# Patient Record
Sex: MALE | Race: WHITE | Employment: OTHER | URBAN - METROPOLITAN AREA
[De-identification: names, ages, dates, MRNs, and addresses within clinical notes are randomized per-mention and may not be internally consistent; named-entity substitution may affect disease eponyms.]

---

## 2019-07-17 ENCOUNTER — OFFICE VISIT (OUTPATIENT)
Dept: PRIMARY CARE CLINIC | Age: 54
End: 2019-07-17
Payer: COMMERCIAL

## 2019-07-17 VITALS
RESPIRATION RATE: 16 BRPM | HEIGHT: 73 IN | BODY MASS INDEX: 33.8 KG/M2 | SYSTOLIC BLOOD PRESSURE: 110 MMHG | HEART RATE: 63 BPM | WEIGHT: 255 LBS | DIASTOLIC BLOOD PRESSURE: 73 MMHG | TEMPERATURE: 98.5 F | OXYGEN SATURATION: 97 %

## 2019-07-17 DIAGNOSIS — J01.10 ACUTE FRONTAL SINUSITIS, RECURRENCE NOT SPECIFIED: Primary | ICD-10-CM

## 2019-07-17 PROCEDURE — 99203 OFFICE O/P NEW LOW 30 MIN: CPT | Performed by: EMERGENCY MEDICINE

## 2019-07-17 RX ORDER — AZITHROMYCIN 250 MG/1
TABLET, FILM COATED ORAL
Qty: 1 PACKET | Refills: 0 | Status: SHIPPED | OUTPATIENT
Start: 2019-07-17 | End: 2019-07-27

## 2019-07-17 SDOH — HEALTH STABILITY: MENTAL HEALTH: HOW OFTEN DO YOU HAVE A DRINK CONTAINING ALCOHOL?: 2-3 TIMES A WEEK

## 2019-07-17 ASSESSMENT — ENCOUNTER SYMPTOMS
SINUS PRESSURE: 1
SORE THROAT: 0

## 2019-07-17 NOTE — PROGRESS NOTES
Subjective:      Patient ID: Joel Babin is a 53 y.o. male. Sinusitis   This is a new problem. Episode onset: 5 days ago. The problem has been gradually worsening since onset. There has been no fever. The pain is moderate. Associated symptoms include congestion, headaches and sinus pressure. Pertinent negatives include no ear pain or sore throat. Past treatments include oral decongestants. The treatment provided mild relief. Review of Systems   HENT: Positive for congestion and sinus pressure. Negative for ear pain and sore throat. Neurological: Positive for headaches. All other systems reviewed and are negative. History reviewed. No pertinent past medical history.   Social History     Socioeconomic History    Marital status:      Spouse name: Not on file    Number of children: Not on file    Years of education: Not on file    Highest education level: Not on file   Occupational History    Not on file   Social Needs    Financial resource strain: Not on file    Food insecurity:     Worry: Not on file     Inability: Not on file    Transportation needs:     Medical: Not on file     Non-medical: Not on file   Tobacco Use    Smoking status: Never Smoker    Smokeless tobacco: Never Used   Substance and Sexual Activity    Alcohol use: Yes     Frequency: 2-3 times a week     Binge frequency: Never    Drug use: Never    Sexual activity: Not on file   Lifestyle    Physical activity:     Days per week: Not on file     Minutes per session: Not on file    Stress: Not on file   Relationships    Social connections:     Talks on phone: Not on file     Gets together: Not on file     Attends Orthodox service: Not on file     Active member of club or organization: Not on file     Attends meetings of clubs or organizations: Not on file     Relationship status: Not on file    Intimate partner violence:     Fear of current or ex partner: Not on file     Emotionally abused: Not on file

## 2020-01-24 ENCOUNTER — OFFICE VISIT (OUTPATIENT)
Dept: PRIMARY CARE CLINIC | Age: 55
End: 2020-01-24
Payer: COMMERCIAL

## 2020-01-24 VITALS
HEIGHT: 73 IN | OXYGEN SATURATION: 96 % | HEART RATE: 67 BPM | WEIGHT: 255 LBS | DIASTOLIC BLOOD PRESSURE: 60 MMHG | BODY MASS INDEX: 33.8 KG/M2 | RESPIRATION RATE: 16 BRPM | SYSTOLIC BLOOD PRESSURE: 120 MMHG | TEMPERATURE: 96 F

## 2020-01-24 PROCEDURE — 99214 OFFICE O/P EST MOD 30 MIN: CPT | Performed by: EMERGENCY MEDICINE

## 2020-01-24 RX ORDER — AZITHROMYCIN 250 MG/1
TABLET, FILM COATED ORAL
Qty: 1 PACKET | Refills: 0 | Status: SHIPPED | OUTPATIENT
Start: 2020-01-24 | End: 2020-02-03

## 2020-01-24 RX ORDER — BENZONATATE 200 MG/1
200 CAPSULE ORAL 3 TIMES DAILY PRN
Qty: 15 CAPSULE | Refills: 0 | Status: SHIPPED | OUTPATIENT
Start: 2020-01-24 | End: 2020-06-05 | Stop reason: ALTCHOICE

## 2020-01-24 ASSESSMENT — ENCOUNTER SYMPTOMS
SORE THROAT: 0
NAUSEA: 0
SHORTNESS OF BREATH: 0
ABDOMINAL PAIN: 0
RHINORRHEA: 0
EYE PAIN: 0
WHEEZING: 0
TROUBLE SWALLOWING: 0
COUGH: 1
DIARRHEA: 0
EYE DISCHARGE: 0
VOMITING: 0

## 2020-01-24 NOTE — PROGRESS NOTES
people with certain health problems. · Be careful when taking over-the-counter cold or flu medicines and Tylenol at the same time. Many of these medicines have acetaminophen, which is Tylenol. Read the labels to make sure that you are not taking more than the recommended dose. Too much acetaminophen (Tylenol) can be harmful. · Get plenty of rest.  · Do not smoke or allow others to smoke around you. If you need help quitting, talk to your doctor about stop-smoking programs and medicines. These can increase your chances of quitting for good. When should you call for help? Call 911 anytime you think you may need emergency care. For example, call if:    · You have severe trouble breathing.    Call your doctor now or seek immediate medical care if:    · You seem to be getting much sicker.     · You have new or worse trouble breathing.     · You have a new or higher fever.     · You have a new rash.    Watch closely for changes in your health, and be sure to contact your doctor if:    · You have a new symptom, such as a sore throat, an earache, or sinus pain.     · You cough more deeply or more often, especially if you notice more mucus or a change in the color of your mucus.     · You do not get better as expected. Where can you learn more? Go to https://Electric Imp.Eniram. org and sign in to your [employer] account. Enter F548 in the [employer]ywire box to learn more about \"Upper Respiratory Infection (Cold): Care Instructions. \"     If you do not have an account, please click on the \"Sign Up Now\" link. Current as of: June 9, 2019  Content Version: 12.3  © 6705-9133 Healthwise, Incorporated. Care instructions adapted under license by [facility] ([facility]). If you have questions about a medical condition or this instruction, always ask your healthcare professional. [place] [address] any warranty or liability for your use of this information.          Patient Education        Cough: Care

## 2020-01-24 NOTE — PATIENT INSTRUCTIONS
RX: ZITHROMAX, TESSALON PERLES    Delsym (over the counter) for cough    Mucinex-D as directed    Use Tylenol or Ibuprofen over the counter (may alternate every 4 hours) as needed for pain/fever    Follow-up with your primary care physician in 1 week if not improving. If you do not have a primary care physician, call [phone] for a referral or visit [email or web address] [employer]/physicians      Patient Education        Upper Respiratory Infection (Cold): Care Instructions  Your Care Instructions    An upper respiratory infection, or URI, is an infection of the nose, sinuses, or throat. URIs are spread by coughs, sneezes, and direct contact. The common cold is the most frequent kind of URI. The flu and sinus infections are other kinds of URIs. Almost all URIs are caused by viruses. Antibiotics won't cure them. But you can treat most infections with home care. This may include drinking lots of fluids and taking over-the-counter pain medicine. You will probably feel better in 4 to 10 days. The doctor has checked you carefully, but problems can develop later. If you notice any problems or new symptoms, get medical treatment right away. Follow-up care is a key part of your treatment and safety. Be sure to make and go to all appointments, and call your doctor if you are having problems. It's also a good idea to know your test results and keep a list of the medicines you take. How can you care for yourself at home? · To prevent dehydration, drink plenty of fluids, enough so that your urine is light yellow or clear like water. Choose water and other caffeine-free clear liquids until you feel better. If you have kidney, heart, or liver disease and have to limit fluids, talk with your doctor before you increase the amount of fluids you drink. · Take an over-the-counter pain medicine, such as acetaminophen (Tylenol), ibuprofen (Advil, Motrin), or naproxen (Aleve). Read and follow all instructions on the label.   · Before you use warranty or liability for your use of this information. Patient Education        Cough: Care Instructions  Your Care Instructions    A cough is your body's response to something that bothers your throat or airways. Many things can cause a cough. You might cough because of a cold or the flu, bronchitis, or asthma. Smoking, postnasal drip, allergies, and stomach acid that backs up into your throat also can cause coughs. A cough is a symptom, not a disease. Most coughs stop when the cause, such as a cold, goes away. You can take a few steps at home to cough less and feel better. Follow-up care is a key part of your treatment and safety. Be sure to make and go to all appointments, and call your doctor if you are having problems. It's also a good idea to know your test results and keep a list of the medicines you take. How can you care for yourself at home? · Drink lots of water and other fluids. This helps thin the mucus and soothes a dry or sore throat. Honey or lemon juice in hot water or tea may ease a dry cough. · Take cough medicine as directed by your doctor. · Prop up your head on pillows to help you breathe and ease a dry cough. · Try cough drops to soothe a dry or sore throat. Cough drops don't stop a cough. Medicine-flavored cough drops are no better than candy-flavored drops or hard candy. · Do not smoke. Avoid secondhand smoke. If you need help quitting, talk to your doctor about stop-smoking programs and medicines. These can increase your chances of quitting for good. When should you call for help? Call 911 anytime you think you may need emergency care.  For example, call if:    · You have severe trouble breathing.    Call your doctor now or seek immediate medical care if:    · You cough up blood.     · You have new or worse trouble breathing.     · You have a new or higher fever.     · You have a new rash.    Watch closely for changes in your health, and be sure to contact your doctor if:    · You cough more deeply or more often, especially if you notice more mucus or a change in the color of your mucus.     · You have new symptoms, such as a sore throat, an earache, or sinus pain.     · You do not get better as expected. Where can you learn more? Go to https://chpepiceweb.[email or web address] org and sign in to your [employer] account. Enter D279 in the [employer] box to learn more about \"Cough: Care Instructions. \"     If you do not have an account, please click on the \"Sign Up Now\" link. Current as of: June 9, 2019  Content Version: 12.3  © 3125-4556 Healthwise, Incorporated. Care instructions adapted under license by [facility] ([facility]). If you have questions about a medical condition or this instruction, always ask your healthcare professional. [address] any warranty or liability for your use of this information.

## 2020-05-19 ENCOUNTER — NURSE ONLY (OUTPATIENT)
Dept: FAMILY MEDICINE CLINIC | Age: 55
End: 2020-05-19
Payer: COMMERCIAL

## 2020-05-19 ENCOUNTER — TELEMEDICINE (OUTPATIENT)
Dept: FAMILY MEDICINE CLINIC | Age: 55
End: 2020-05-19
Payer: COMMERCIAL

## 2020-05-19 VITALS
DIASTOLIC BLOOD PRESSURE: 80 MMHG | WEIGHT: 255 LBS | HEIGHT: 73 IN | OXYGEN SATURATION: 98 % | HEART RATE: 97 BPM | SYSTOLIC BLOOD PRESSURE: 112 MMHG | BODY MASS INDEX: 33.8 KG/M2

## 2020-05-19 PROBLEM — N28.1 RENAL CYST, LEFT: Status: ACTIVE | Noted: 2017-12-12

## 2020-05-19 PROBLEM — E66.9 OBESITY: Status: ACTIVE | Noted: 2020-05-19

## 2020-05-19 PROBLEM — N52.9 ERECTILE DYSFUNCTION: Status: ACTIVE | Noted: 2017-12-12

## 2020-05-19 PROBLEM — I48.91 ATRIAL FIBRILLATION (HCC): Status: ACTIVE | Noted: 2020-05-19

## 2020-05-19 PROCEDURE — 99213 OFFICE O/P EST LOW 20 MIN: CPT | Performed by: FAMILY MEDICINE

## 2020-05-19 PROCEDURE — 93000 ELECTROCARDIOGRAM COMPLETE: CPT | Performed by: FAMILY MEDICINE

## 2020-05-19 PROCEDURE — 93010 ELECTROCARDIOGRAM REPORT: CPT | Performed by: FAMILY MEDICINE

## 2020-05-19 RX ORDER — METOPROLOL SUCCINATE 50 MG/1
50 TABLET, EXTENDED RELEASE ORAL DAILY
Qty: 30 TABLET | Refills: 3 | Status: SHIPPED | OUTPATIENT
Start: 2020-05-19 | End: 2020-06-18 | Stop reason: SDUPTHER

## 2020-05-19 NOTE — PROGRESS NOTES
2020    TELEHEALTH EVALUATION -- Audio/Visual (During [facility]- public health emergency)    CC: A fib    HPI    Karen Stanford (:  1965) [place] 53 y.o. male has requested an audio/video evaluation for the following concern(s):    A fib  Onset unclear, pt first noticed fatigue approx 4-5 weeks ago once he started mowing yard, apple watch alerted him with afib warning  [id number] of 0    obesity  BMI of 33    Low testosterone  Was placed on testosterone patch by last provider but discontinued on his own    Review of Systems    Constitutional: + fatigue for past 4-5 weeks  Pulmonary: No cough    All other systems reviewed and negative    Prior to Visit Medications    Medication Sig Taking? Authorizing Provider   metoprolol succinate (TOPROL XL) 50 MG extended release tablet Take 1 tablet by mouth daily Yes Elza Hernández MD   benzonatate (TESSALON) 200 MG capsule Take 1 capsule by mouth 3 times daily as needed for Cough (cough)  Becky Barnes MD        No Known Allergies    History reviewed. No pertinent past medical history. History reviewed. No pertinent surgical history.     Social History     Socioeconomic History    Marital status:      Spouse name: Not on file    Number of children: Not on file    Years of education: Not on file    Highest education level: Not on file   Occupational History    Not on file   Social Needs    Financial resource strain: Not on file    Food insecurity     Worry: Not on file     Inability: Not on file    Transportation needs     Medical: Not on file     Non-medical: Not on file   Tobacco Use    Smoking status: Never Smoker    Smokeless tobacco: Never Used   Substance and Sexual Activity    Alcohol use: Yes     Frequency: 2-3 times a week     Binge frequency: Never    Drug use: Never    Sexual activity: Not on file   Lifestyle    Physical activity     Days per week: Not on file     Minutes per session: Not on file    Stress: Not on file Rafat Alfaro MD, Cardiology, [facility]    Addendum  EKG interpreted by myself in office    Obesity, unspecified classification, unspecified obesity type, unspecified whether serious comorbidity present  Diet, exercise    Hypotestosteronism  -     Testosterone, free, total; Future    Other fatigue  -     TSH with Reflex; Future  -     CBC; Future    Healthcare maintenance  -     Hemoglobin A1C; Future  -     Lipid Panel; Future  -     Comprehensive Metabolic Panel; Future    Return in about 3 months (around 2020). Nicole Joy (:  1965) [place] 53 y.o. male is being evaluated by a Virtual Visit (video visit) encounter to address concerns as mentioned above. A caregiver was present when appropriate. Due to this being a TeleHealth encounter ([facility]), evaluation of the following organ systems was limited: Vitals/Constitutional/EENT/Resp/CV/GI//MS/Neuro/Skin/Heme-Lymph-Imm. Pursuant to the emergency declaration under the [address] [place] [address], [address] [place] [address] authority and the [facility] and [employer] Act, this Virtual Visit was conducted with patient's (and/or legal guardian's) consent, to reduce the patient's risk of exposure to COVID-19 and provide necessary medical care. The patient (and/or legal guardian) has also been advised to contact this office for worsening conditions or problems, and seek emergency medical treatment and/or call 911 if deemed necessary. Patient identification was verified at the start of the visit: Yes    Total time spent on this encounter: Not billed by time    Services were provided through a video synchronous discussion virtually to substitute for in-person clinic visit. Patient and provider were located at their individual homes. An  electronic signature was used to authenticate this note.     --Criss Cox MD on 2020 at 2:53 PM

## 2020-05-19 NOTE — PROGRESS NOTES
Patient was seen as a VV today and due to his symptoms he was sent to the [facility] to get a EKG. I sent a message to Dr. Lacie Sheth to have him look at the result of the EKG from [facility]. He was at home. He came back and said that he was going to look up to see what medication to put him on due him having A-Fib. I asked if he could go home or does he needs to stay. I got the rest of the vitals because I was not the one that did the EKG. He then asked me to:    [5/19/2020 11:58 AM]  Jovanna Smith R:    once we get the BP, also he will need to start anticoagulation medicine and BP medicine  can you make sure the correct pharmacy is entered in  I will send them in shortly  he needs to  and start taking today. I did not feel comfortable going over his newly Dx with the patient and told Dr. Lacie Sheth that I would tell the patient that he would be calling the patient to go over everything with him. Patient was told he could go home and that Dr. Lacie Sheth would be calling him today.   Patient left with information that meds would be called in and a phone call from MD. I then went to lunch after I told Dr. Lacie Sheth that the patient is waiting for a phone from him

## 2020-05-19 NOTE — PROGRESS NOTES
palpation  Joint:  no active joint inflammation  Musculoskeletal:  negative  Skin:  Warm and dry  Neck:  Negative for JVD and Carotid Bruits. Chest:  Clear to auscultation, respiration easy  Cardiovascular:  irreg irreg S2 normal, no murmur, no rub or thrill. Abdomen:  Soft normal liver and spleen  Extremities:   No edema, clubbing, cyanosis,  Pulses:  pedal pulses are normal.  Neuro: intact    Medications:   Outpatient Encounter Medications as of 5/22/2020   Medication Sig Dispense Refill    apixaban (ELIQUIS) 5 MG TABS tablet Take 1 tablet by mouth 2 times daily 60 tablet 5    metoprolol succinate (TOPROL XL) 50 MG extended release tablet Take 1 tablet by mouth daily 30 tablet 3    benzonatate (TESSALON) 200 MG capsule Take 1 capsule by mouth 3 times daily as needed for Cough (cough) (Patient not taking: Reported on 5/22/2020) 15 capsule 0     No facility-administered encounter medications on file as of 5/22/2020. Lab Data:  CBC: No results for input(s): WBC, HGB, HCT, MCV, PLT in the last 72 hours. BMP: No results for input(s): NA, K, CL, CO2, PHOS, BUN, CREATININE in the last 72 hours. Invalid input(s): CA  LIVER PROFILE: No results for input(s): AST, ALT, LIPASE, BILIDIR, BILITOT, ALKPHOS in the last 72 hours. Invalid input(s): AMYLASE,  ALB  LIPID: No results found for: CHOL  No results found for: TRIG  No results found for: HDL  No results found for: LDLCHOLESTEROL, LDLCALC  No results found for: LABVLDL, VLDL  No results found for: CHOLHDLRATIO  PT/INR: No results for input(s): PROTIME, INR in the last 72 hours. A1C: No results found for: LABA1C  BNP:  No results for input(s): BNP in the last 72 hours.     IMAGING:     [id number] Score for Atrial Fibrillation Stroke Risk   Risk   Factors  Component Value   C CHF No 0   H HTN No 0   A2 Age >= 75 No,  (53 y.o.) 0   D DM No 0   S2 Prior Stroke/TIA No 0   V Vascular Disease No 0   A Age 74-69 No,  (53 y.o.) 0   Sc Sex male 0

## 2020-05-22 ENCOUNTER — OFFICE VISIT (OUTPATIENT)
Dept: CARDIOLOGY CLINIC | Age: 55
End: 2020-05-22
Payer: COMMERCIAL

## 2020-05-22 VITALS
HEIGHT: 73 IN | WEIGHT: 268 LBS | BODY MASS INDEX: 35.52 KG/M2 | HEART RATE: 82 BPM | SYSTOLIC BLOOD PRESSURE: 104 MMHG | DIASTOLIC BLOOD PRESSURE: 70 MMHG | OXYGEN SATURATION: 97 %

## 2020-05-22 PROCEDURE — 99244 OFF/OP CNSLTJ NEW/EST MOD 40: CPT | Performed by: INTERNAL MEDICINE

## 2020-05-22 NOTE — PATIENT INSTRUCTIONS
MARTINEZ with cardioversion at [facility] week after next. Do not eating anything after midnight. Taking morning meds with sip of water. TSH Lipid, and CBC, CMP fasting  Chest xray  1. GXT Myoview after cardioversion  [phone]  2. Start Eliquis 5mg twice a day short term  3.  Continue the Metoprolol

## 2020-05-26 ENCOUNTER — HOSPITAL ENCOUNTER (OUTPATIENT)
Dept: GENERAL RADIOLOGY | Age: 55
Discharge: HOME | End: 2020-05-26
Payer: COMMERCIAL

## 2020-05-26 ENCOUNTER — HOSPITAL ENCOUNTER (OUTPATIENT)
Age: 55
Discharge: HOME | End: 2020-05-26
Payer: COMMERCIAL

## 2020-05-26 ENCOUNTER — TELEPHONE (OUTPATIENT)
Dept: CARDIOLOGY CLINIC | Age: 55
End: 2020-05-26

## 2020-05-26 DIAGNOSIS — E34.9 HYPOTESTOSTERONISM: ICD-10-CM

## 2020-05-26 DIAGNOSIS — Z00.00 HEALTHCARE MAINTENANCE: ICD-10-CM

## 2020-05-26 LAB
A/G RATIO: 1.9 (ref 1.1–2.2)
ALBUMIN SERPL-MCNC: 4.6 G/DL (ref 3.4–5)
ALP BLD-CCNC: 84 U/L (ref 40–129)
ALT SERPL-CCNC: 26 U/L (ref 10–40)
ANION GAP SERPL CALCULATED.3IONS-SCNC: 12 MMOL/L (ref 3–16)
AST SERPL-CCNC: 22 U/L (ref 15–37)
BASOPHILS ABSOLUTE: 0.1 K/UL (ref 0–0.2)
BASOPHILS RELATIVE PERCENT: 1.2 %
BILIRUB SERPL-MCNC: 1.2 MG/DL (ref 0–1)
BUN BLDV-MCNC: 15 MG/DL (ref 7–20)
CALCIUM SERPL-MCNC: 9.6 MG/DL (ref 8.3–10.6)
CHLORIDE BLD-SCNC: 99 MMOL/L (ref 99–110)
CHOLESTEROL, FASTING: 160 MG/DL (ref 0–199)
CO2: 26 MMOL/L (ref 21–32)
CREAT SERPL-MCNC: 1 MG/DL (ref 0.9–1.3)
EOSINOPHILS ABSOLUTE: 0.2 K/UL (ref 0–0.6)
EOSINOPHILS RELATIVE PERCENT: 2.9 %
GFR AFRICAN AMERICAN: >60
GFR NON-AFRICAN AMERICAN: >60
GLOBULIN: 2.4 G/DL
GLUCOSE BLD-MCNC: 110 MG/DL (ref 70–99)
HCT VFR BLD CALC: 47.5 % (ref 40.5–52.5)
HDLC SERPL-MCNC: 61 MG/DL (ref 40–60)
HEMOGLOBIN: 15.6 G/DL (ref 13.5–17.5)
LDL CHOLESTEROL CALCULATED: 87 MG/DL
LYMPHOCYTES ABSOLUTE: 1.5 K/UL (ref 1–5.1)
LYMPHOCYTES RELATIVE PERCENT: 22.2 %
MCH RBC QN AUTO: 29 PG (ref 26–34)
MCHC RBC AUTO-ENTMCNC: 32.9 G/DL (ref 31–36)
MCV RBC AUTO: 88.2 FL (ref 80–100)
MONOCYTES ABSOLUTE: 0.6 K/UL (ref 0–1.3)
MONOCYTES RELATIVE PERCENT: 9.4 %
NEUTROPHILS ABSOLUTE: 4.3 K/UL (ref 1.7–7.7)
NEUTROPHILS RELATIVE PERCENT: 64.3 %
PDW BLD-RTO: 14.5 % (ref 12.4–15.4)
PLATELET # BLD: 237 K/UL (ref 135–450)
PMV BLD AUTO: 9.3 FL (ref 5–10.5)
POTASSIUM SERPL-SCNC: 4.4 MMOL/L (ref 3.5–5.1)
RBC # BLD: 5.38 M/UL (ref 4.2–5.9)
SODIUM BLD-SCNC: 137 MMOL/L (ref 136–145)
TOTAL PROTEIN: 7 G/DL (ref 6.4–8.2)
TRIGLYCERIDE, FASTING: 61 MG/DL (ref 0–150)
TSH REFLEX FT4: 0.97 UIU/ML (ref 0.27–4.2)
VLDLC SERPL CALC-MCNC: 12 MG/DL
WBC # BLD: 6.8 K/UL (ref 4–11)

## 2020-05-26 PROCEDURE — 71046 X-RAY EXAM CHEST 2 VIEWS: CPT

## 2020-05-26 NOTE — TELEPHONE ENCOUNTER
----- Message from Kristi Moran MD sent at 5/26/2020 10:49 AM EDT -----  Chest xray is normal please notify patient.

## 2020-05-27 ENCOUNTER — TELEPHONE (OUTPATIENT)
Dept: CARDIOLOGY CLINIC | Age: 55
End: 2020-05-27

## 2020-05-27 LAB
ESTIMATED AVERAGE GLUCOSE: 125.5 MG/DL
HBA1C MFR BLD: 6 %

## 2020-05-27 NOTE — TELEPHONE ENCOUNTER
Per RKG pt scheduled for MARTINEZ with anesthesia and DCCV for Friday 6/5/20 at 10 am at [place] with [facility]. Procedure reviewed in detail advised NPO after MN day before ok to take 2 meds with sip of water arrive at main entrance at 8:30 am.  He willgo this Friday 5/29/20 to have COVID testing completed orders for all testing placed in epic. Fracisco Neff is aware of policy for covid as well as wearing mask to hospital and need for  to wear mask as well.   Orders faxed to stress lab Margot Lewis RN

## 2020-06-01 LAB
SEX HORMONE BINDING GLOBULIN: 28 NMOL/L (ref 11–80)
TESTOSTERONE FREE-NONMALE: 72.7 PG/ML (ref 47–244)
TESTOSTERONE TOTAL: 333 NG/DL (ref 220–1000)

## 2020-06-02 ENCOUNTER — HOSPITAL ENCOUNTER (OUTPATIENT)
Dept: NON INVASIVE DIAGNOSTICS | Age: 55
Discharge: HOME | End: 2020-06-02
Payer: COMMERCIAL

## 2020-06-02 ENCOUNTER — OFFICE VISIT (OUTPATIENT)
Dept: PRIMARY CARE CLINIC | Age: 55
End: 2020-06-02

## 2020-06-02 LAB
LV EF: 65 %
LVEF MODALITY: NORMAL

## 2020-06-02 PROCEDURE — 78452 HT MUSCLE IMAGE SPECT MULT: CPT

## 2020-06-02 PROCEDURE — A9502 TC99M TETROFOSMIN: HCPCS | Performed by: INTERNAL MEDICINE

## 2020-06-02 PROCEDURE — 3430000000 HC RX DIAGNOSTIC RADIOPHARMACEUTICAL: Performed by: INTERNAL MEDICINE

## 2020-06-02 PROCEDURE — 2580000003 HC RX 258: Performed by: INTERNAL MEDICINE

## 2020-06-02 PROCEDURE — 93017 CV STRESS TEST TRACING ONLY: CPT

## 2020-06-02 RX ORDER — SODIUM CHLORIDE 0.9 % (FLUSH) 0.9 %
10 SYRINGE (ML) INJECTION PRN
Status: DISCONTINUED | OUTPATIENT
Start: 2020-06-02 | End: 2020-06-03 | Stop reason: HOSPADM

## 2020-06-02 RX ADMIN — TETROFOSMIN 33.5 MILLICURIE: 1.38 INJECTION, POWDER, LYOPHILIZED, FOR SOLUTION INTRAVENOUS at 09:04

## 2020-06-02 RX ADMIN — TETROFOSMIN 11.8 MILLICURIE: 1.38 INJECTION, POWDER, LYOPHILIZED, FOR SOLUTION INTRAVENOUS at 08:03

## 2020-06-02 RX ADMIN — Medication 10 ML: at 08:04

## 2020-06-02 RX ADMIN — Medication 10 ML: at 09:04

## 2020-06-03 ENCOUNTER — TELEPHONE (OUTPATIENT)
Dept: CARDIOLOGY CLINIC | Age: 55
End: 2020-06-03

## 2020-06-03 LAB
SARS-COV-2: NOT DETECTED
SOURCE: NORMAL

## 2020-06-04 ENCOUNTER — ANESTHESIA EVENT (OUTPATIENT)
Dept: NON INVASIVE DIAGNOSTICS | Age: 55
End: 2020-06-04

## 2020-06-04 NOTE — ANESTHESIA PRE PROCEDURE
Department of Anesthesiology  Preprocedure Note       Name:  Yasmeen Garcia   Age:  53 y.o.  :  1965                                          MRN:  [id number]         Date:  2020      Surgeon:  Dr. Olimpia Gaming    Procedure: MARTINEZ and Cardioversion    HPI:  This is a 52 y/o male with a h/o atrial fibrillation. EKG on 2020 showed atrial fibrillation with 89 bpm. He states that he started noticing increased fatigue a few months ago about mid March. 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 He denies smoking,  May drink once a year. His father had cardiac history and on his mother's family. He denies HTN or DM. He states that he has had kidney stones in the past. He is taking his medications as prescribed. He does not notice a difference with metoprolol. He states he does have chest tightness at times. Cannot get deep breath. He walks daily about a mile with dog. He has never been tested for sleep apnea. Medications prior to admission:   apixaban (ELIQUIS) 5 MG TABS tablet Take 1 tablet by mouth 2 times daily   metoprolol succinate (TOPROL XL) 50 MG Take 1 tablet by mouth daily   benzonatate (TESSALON) 200 MG capsule Take 1 cap 3 times daily as needed for Cough (cough) Patient not taking: Reported on 2020     Allergies:  No Known Allergies    Problem List:     Hypotestosteronism E34.9    Erectile dysfunction N52.9    Obesity E66.9    Atrial fibrillation (HCC) I48.91     Past Medical History:  No past medical history on file. Past Surgical History:  No past surgical history on file.     Social History:    Tobacco Use    Smoking status: Never Smoker    Smokeless tobacco: Never Used   Substance Use Topics    Alcohol use: Yes     Frequency: 2-3 times a week     Binge frequency: Never     Vital Signs (Current):    BP: 112/72 Pulse: 81   Resp: 21 SpO2: 96   Temp: 97 °F (36.1 °C)   Height: 6' 1\" (1.854 m)  (20) Weight: 262 lb (118.8 kg)

## 2020-06-05 ENCOUNTER — ANESTHESIA (OUTPATIENT)
Dept: NON INVASIVE DIAGNOSTICS | Age: 55
End: 2020-06-05

## 2020-06-05 ENCOUNTER — HOSPITAL ENCOUNTER (OUTPATIENT)
Dept: NON INVASIVE DIAGNOSTICS | Age: 55
Discharge: HOME | End: 2020-06-05
Payer: COMMERCIAL

## 2020-06-05 VITALS
HEIGHT: 73 IN | TEMPERATURE: 96.5 F | OXYGEN SATURATION: 98 % | HEART RATE: 59 BPM | RESPIRATION RATE: 23 BRPM | DIASTOLIC BLOOD PRESSURE: 76 MMHG | BODY MASS INDEX: 34.72 KG/M2 | SYSTOLIC BLOOD PRESSURE: 113 MMHG | WEIGHT: 262 LBS

## 2020-06-05 VITALS
DIASTOLIC BLOOD PRESSURE: 59 MMHG | OXYGEN SATURATION: 99 % | SYSTOLIC BLOOD PRESSURE: 98 MMHG | RESPIRATION RATE: 18 BRPM

## 2020-06-05 LAB
EKG ATRIAL RATE: 394 BPM
EKG ATRIAL RATE: 57 BPM
EKG DIAGNOSIS: NORMAL
EKG DIAGNOSIS: NORMAL
EKG P AXIS: 30 DEGREES
EKG P-R INTERVAL: 138 MS
EKG Q-T INTERVAL: 372 MS
EKG Q-T INTERVAL: 422 MS
EKG QRS DURATION: 96 MS
EKG QRS DURATION: 96 MS
EKG QTC CALCULATION (BAZETT): 410 MS
EKG QTC CALCULATION (BAZETT): 434 MS
EKG R AXIS: 32 DEGREES
EKG R AXIS: 34 DEGREES
EKG T AXIS: 24 DEGREES
EKG T AXIS: 49 DEGREES
EKG VENTRICULAR RATE: 57 BPM
EKG VENTRICULAR RATE: 82 BPM
LV EF: 55 %
LVEF MODALITY: NORMAL

## 2020-06-05 PROCEDURE — 3700000000 HC ANESTHESIA ATTENDED CARE

## 2020-06-05 PROCEDURE — 92960 CARDIOVERSION ELECTRIC EXT: CPT

## 2020-06-05 PROCEDURE — 7100000010 HC PHASE II RECOVERY - FIRST 15 MIN

## 2020-06-05 PROCEDURE — 93325 DOPPLER ECHO COLOR FLOW MAPG: CPT | Performed by: INTERNAL MEDICINE

## 2020-06-05 PROCEDURE — 93320 DOPPLER ECHO COMPLETE: CPT | Performed by: INTERNAL MEDICINE

## 2020-06-05 PROCEDURE — 93312 ECHO TRANSESOPHAGEAL: CPT | Performed by: INTERNAL MEDICINE

## 2020-06-05 PROCEDURE — 93005 ELECTROCARDIOGRAM TRACING: CPT | Performed by: INTERNAL MEDICINE

## 2020-06-05 PROCEDURE — 2580000003 HC RX 258: Performed by: NURSE ANESTHETIST, CERTIFIED REGISTERED

## 2020-06-05 PROCEDURE — 6360000002 HC RX W HCPCS: Performed by: NURSE ANESTHETIST, CERTIFIED REGISTERED

## 2020-06-05 PROCEDURE — 3700000001 HC ADD 15 MINUTES (ANESTHESIA)

## 2020-06-05 PROCEDURE — 7100000011 HC PHASE II RECOVERY - ADDTL 15 MIN

## 2020-06-05 PROCEDURE — 2500000003 HC RX 250 WO HCPCS: Performed by: NURSE ANESTHETIST, CERTIFIED REGISTERED

## 2020-06-05 PROCEDURE — 93312 ECHO TRANSESOPHAGEAL: CPT

## 2020-06-05 PROCEDURE — 93010 ELECTROCARDIOGRAM REPORT: CPT | Performed by: INTERNAL MEDICINE

## 2020-06-05 RX ORDER — SODIUM CHLORIDE 9 MG/ML
INJECTION, SOLUTION INTRAVENOUS CONTINUOUS PRN
Status: DISCONTINUED | OUTPATIENT
Start: 2020-06-05 | End: 2020-06-05 | Stop reason: SDUPTHER

## 2020-06-05 RX ORDER — LIDOCAINE HYDROCHLORIDE 20 MG/ML
INJECTION, SOLUTION EPIDURAL; INFILTRATION; INTRACAUDAL; PERINEURAL PRN
Status: DISCONTINUED | OUTPATIENT
Start: 2020-06-05 | End: 2020-06-05 | Stop reason: SDUPTHER

## 2020-06-05 RX ORDER — PROPOFOL 10 MG/ML
INJECTION, EMULSION INTRAVENOUS PRN
Status: DISCONTINUED | OUTPATIENT
Start: 2020-06-05 | End: 2020-06-05 | Stop reason: SDUPTHER

## 2020-06-05 RX ADMIN — LIDOCAINE HYDROCHLORIDE 100 MG: 20 INJECTION, SOLUTION EPIDURAL; INFILTRATION; INTRACAUDAL; PERINEURAL at 10:01

## 2020-06-05 RX ADMIN — PROPOFOL 190 MG: 10 INJECTION, EMULSION INTRAVENOUS at 10:01

## 2020-06-05 RX ADMIN — SODIUM CHLORIDE: 9 INJECTION, SOLUTION INTRAVENOUS at 09:36

## 2020-06-05 NOTE — PROGRESS NOTES
Discharge instructions given. Pt instructed to call 9-1-1 with CP. Notify Dr. Sirisha Ojeda with any questions or concerns. Pt will f/u with Dr. Sirisha Ojeda in his office.

## 2020-06-05 NOTE — H&P
History and Physical  Bette Skinner   [birth year]    Patient is here for elective   Transesophageal echocardiogram with Direct current cardioversion to follow  Patient has been on anticoagulant eliquis for about 2 weeks and he is compliant with meds  He is NPO since 6 PM last night. Since my first encounter with him 2 weeks ago interim history and current physical exam is unchanged. Ref By Dr Kamari Rocha    Subjective:  Mr. Lisa Chu is being referred by Dr. Norma Ng for fatigue to assess for atrial fibrillation. EKG on 2020 showed atrial fibrillation with 89 bpm.    He states that he started noticing increased fatigue a few months ago about mid March. 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 He denies smoking,  May drink once a year. His father had cardiac history and on his mother's family. He denies HTN or DM. He states that he has had kidney stones in the past. He is taking his medications as prescribed. He does not notice a difference with metoprolol. He states he does have chest tightness at times. Cannot get deep breath. He walks daily about a mile with dog. He has never been tested for sleep apnea.       HPI: He was diagnosed with Afib on 2020. He has no past cardiac history.          Review of Systems:12 point ROS negative in all areas as listed below except as in [place]  Constitutional, EENT, Cardiovascular, pulmonary, GI, , Musculoskeletal, skin, neurological, hematological, endocrine, Psychiatric        Reviewed past medical history, social, and family history. Smoking none alcohol none  Family history of CAD both sides     Past Medical History   History reviewed. No pertinent past medical history. Past Surgical History   History reviewed.  No pertinent surgical history.        Objective:   /70   Pulse 82   Ht 6' 1\" (1.854 m)   Wt 268 lb (121.6 kg)   SpO2 97%   BMI

## 2020-06-10 ENCOUNTER — TELEPHONE (OUTPATIENT)
Dept: CARDIOLOGY CLINIC | Age: 55
End: 2020-06-10

## 2020-06-10 NOTE — TELEPHONE ENCOUNTER
I called patient and discussed his symptoms he may be going back and forth in to afib  He will get 49 hr Holter and then if he is found to be parox afib I want to refer him to Dr Thuy Tirado or Dr Shavonne Null

## 2020-06-11 ENCOUNTER — TELEPHONE (OUTPATIENT)
Dept: CARDIOLOGY CLINIC | Age: 55
End: 2020-06-11

## 2020-06-11 NOTE — PROGRESS NOTES
Referral to Dr. Valentina Duran for sleep study per Dr. Ines Alejandro. Indication SOB, possible SHARRI. Order placed in epic. Called pt to inform him. No answer. VM is full.

## 2020-06-29 ENCOUNTER — TELEPHONE (OUTPATIENT)
Dept: CARDIOLOGY | Age: 55
End: 2020-06-29

## 2020-06-29 RX ORDER — METOPROLOL SUCCINATE 100 MG/1
100 TABLET, EXTENDED RELEASE ORAL DAILY
Qty: 90 TABLET | Refills: 3 | Status: SHIPPED | OUTPATIENT
Start: 2020-06-29 | End: 2020-11-12

## 2020-06-30 ENCOUNTER — OFFICE VISIT (OUTPATIENT)
Dept: CARDIOLOGY CLINIC | Age: 55
End: 2020-06-30
Payer: COMMERCIAL

## 2020-06-30 VITALS
HEIGHT: 73 IN | OXYGEN SATURATION: 98 % | DIASTOLIC BLOOD PRESSURE: 60 MMHG | SYSTOLIC BLOOD PRESSURE: 90 MMHG | BODY MASS INDEX: 35.06 KG/M2 | WEIGHT: 264.5 LBS | HEART RATE: 84 BPM

## 2020-06-30 PROBLEM — R53.83 OTHER FATIGUE: Status: ACTIVE | Noted: 2020-06-30

## 2020-06-30 PROCEDURE — 93000 ELECTROCARDIOGRAM COMPLETE: CPT | Performed by: INTERNAL MEDICINE

## 2020-06-30 PROCEDURE — 99204 OFFICE O/P NEW MOD 45 MIN: CPT | Performed by: INTERNAL MEDICINE

## 2020-06-30 NOTE — PATIENT INSTRUCTIONS
Patient Education        Atrial Fibrillation: Care Instructions  Your Care Instructions     Atrial fibrillation is an irregular and often fast heartbeat. Treating this condition is important for several reasons. It can cause blood clots, which can travel from your heart to your brain and cause a stroke. If you have a fast heartbeat, you may feel lightheaded, dizzy, and weak. An irregular heartbeat can also increase your risk for heart failure. Atrial fibrillation is often the result of another heart condition, such as high blood pressure or coronary artery disease. Making changes to improve your heart condition will help you stay healthy and active. Follow-up care is a key part of your treatment and safety. Be sure to make and go to all appointments, and call your doctor if you are having problems. It's also a good idea to know your test results and keep a list of the medicines you take. How can you care for yourself at home? Medicines  · Take your medicines exactly as prescribed. Call your doctor if you think you are having a problem with your medicine. You will get more details on the specific medicines your doctor prescribes. · If your doctor has given you a blood thinner to prevent a stroke, be sure you get instructions about how to take your medicine safely. Blood thinners can cause serious bleeding problems. · Do not take any vitamins, over-the-counter drugs, or herbal products without talking to your doctor first.  Lifestyle changes  · Do not smoke. Smoking can increase your chance of a stroke and heart attack. If you need help quitting, talk to your doctor about stop-smoking programs and medicines. These can increase your chances of quitting for good. · Eat a heart-healthy diet. · Stay at a healthy weight. Lose weight if you need to. · Limit alcohol to 2 drinks a day for men and 1 drink a day for women. Too much alcohol can cause health problems. · Avoid colds and flu.  Get a pneumococcal vaccine shot. If you have had one before, ask your doctor whether you need another dose. Get a flu shot every year. If you must be around people with colds or flu, wash your hands often. Activity  · If your doctor recommends it, get more exercise. Walking is a good choice. Bit by bit, increase the amount you walk every day. Try for at least 30 minutes on most days of the week. You also may want to swim, bike, or do other activities. Your doctor may suggest that you join a cardiac rehabilitation program so that you can have help increasing your physical activity safely. · Start light exercise if your doctor says it is okay. Even a small amount will help you get stronger, have more energy, and manage stress. Walking is an easy way to get exercise. Start out by walking a little more than you did in the hospital. Gradually increase the amount you walk. · When you exercise, watch for signs that your heart is working too hard. You are pushing too hard if you cannot talk while you are exercising. If you become short of breath or dizzy or have chest pain, sit down and rest immediately. · Check your pulse regularly. Place two fingers on the artery at the palm side of your wrist, in line with your thumb. If your heartbeat seems uneven or fast, talk to your doctor. When should you call for help? [id number] anytime you think you may need emergency care. For example, call if:  · You have symptoms of a heart attack. These may include:  ? Chest pain or pressure, or a strange feeling in the chest.  ? Sweating. ? Shortness of breath. ? Nausea or vomiting. ? Pain, pressure, or a strange feeling in the back, neck, jaw, or upper belly or in one or both shoulders or arms. ? Lightheadedness or sudden weakness. ? A fast or irregular heartbeat. After you call 911, the  may tell you to chew 1 adult-strength or 2 to 4 low-dose aspirin. Wait for an ambulance. Do not try to drive yourself. · You have symptoms of a stroke.  These may rate also may be faster than normal.  Atrial fibrillation can be dangerous because if the heartbeat isn't strong and steady, blood can collect, or pool, in the atria. And pooled blood is more likely to form clots. Clots can travel to the brain, block blood flow, and cause a stroke. Atrial fibrillation can also lead to heart failure. Treatment for atrial fibrillation helps prevent stroke and heart failure. It also helps relieve symptoms. Atrial fibrillation is often caused by another heart problem. It may happen after heart surgery. It may also be caused by other problems, such as an overactive thyroid gland or lung disease. Many people with atrial fibrillation are able to live full and active lives. What are the symptoms? Some people feel symptoms when they have episodes of atrial fibrillation. But other people don't notice any symptoms. If you have symptoms, you may feel:  · A fluttering, racing, or pounding feeling in your chest called palpitations. · Weak or tired. · Dizzy or lightheaded. · Short of breath. · Chest pain. · Confused. You may notice signs of atrial fibrillation when you check your pulse. Your pulse may seem uneven or fast.  What can you expect when you have atrial fibrillation? At first, spells of atrial fibrillation may come on suddenly and last a short time. It may go away on its own or it goes away after treatment. This is called paroxysmal atrial fibrillation. Over time, the spells may last longer and occur more often. They often don't go away on their own. How is it treated? Treatments can help you feel better and prevent future problems, especially stroke and heart failure. The main types of treatment slow the heart rate, control the heart rhythm, and help prevent stroke. Your treatment will depend on the cause of your atrial fibrillation, your symptoms, and your risk for stroke. · Heart rate treatment. Medicine may be used to slow your heart rate.  Your heartbeat may still be irregular. But these medicines keep your heart from beating too fast. They may also help relieve your symptoms. · Heart rhythm treatment. Different treatments may be used to try to stop atrial fibrillation and keep it from returning. They can also relieve symptoms. These treatments include medicine, electrical cardioversion to shock the heart back to a normal rhythm, a procedure called catheter ablation, and heart surgery. · Stroke prevention. You and your doctor can decide how to lower your risk. You may decide to take a blood-thinning medicine called an anticoagulant. How can you live well with it? You can live well and help manage atrial fibrillation by having a heart-healthy lifestyle. This lifestyle may help reduce how often you have episodes of atrial fibrillation. If you are overweight, losing weight can help relieve symptoms. To have a heart-healthy lifestyle:  · Don't smoke. · Eat heart-healthy foods. · Be active. Talk to your doctor about what type and level of exercise is safe for you. · Stay at a healthy weight. Lose weight if you need to. · Avoid alcohol if it triggers symptoms. · Manage other health problems such as high blood pressure, high cholesterol, and diabetes. · Avoid getting sick from the flu. Get a flu shot every year. · Manage stress. Where can you learn more? Go to https://REM ENTERPRISE.[place] [facility] [place]. org and sign in to your [employer] account. Enter W991 in the [facility] box to learn more about \"Learning About Atrial Fibrillation. \"     If you do not have an account, please click on the \"Sign Up Now\" link. Current as of: December 16, 2019               Content Version: 12.5  © 0390-9039 Healthwise, Incorporated. Care instructions adapted under license by [facility] ([facility]).  If you have questions about a medical condition or this instruction, always ask your healthcare professional. [place] [address] any warranty or liability for your use of this information. Patient Education        Learning About Catheter Ablation for Heart Rhythm Problems  What is catheter ablation? Catheter ablation is a procedure that treats heart rhythm problems. These problems include atrial fibrillation, supraventricular tachycardia (SVT), atrial flutter, and ventricular tachycardia. Your heart should have a strong, steady beat. That beat is controlled by the heart's electrical system. Sometimes that system misfires. This causes a heartbeat that is too fast and isn't steady. Catheter ablation is a way to get into your heart and fix the problem. Ablation is not surgery. How is catheter ablation done? Your doctor inserts thin tubes called catheters into a blood vessel in your groin, arm, or neck. Then your doctor feeds them into the heart. Wires in the catheters help the doctor find the problem areas. Then he or she uses the wires to send energy to destroy the tiny areas of heart tissue that are causing the problems. It may seem like a bad idea to destroy parts of your heart on purpose. But the areas that are destroyed are very tiny. They should not affect your heart's ability to do its job. You may be awake during the procedure. Or you may be asleep. The doctor will give you medicines to help you feel relaxed and to numb the areas where the catheters go in. You may feel a little uncomfortable, but you should not feel pain. This procedure usually takes 2 to 6 hours. In rare cases, it can take longer. You may stay overnight in the hospital. How long you stay in the hospital depends on the type of ablation you have. What can you expect after catheter ablation? Do not exercise hard or lift anything heavy for a week. You will probably be able to go back to work and to your normal routine in 1 or 2 days. You may have swelling, bruising, or a small lump around the site where the catheters went into your body. These should go away in 3 to 4 weeks.   You may have to take some medicines for a while. Follow-up care is a key part of your treatment and safety. Be sure to make and go to all appointments, and call your doctor if you are having problems. It's also a good idea to know your test results and keep a list of the medicines you take. Where can you learn more? Go to https://chpepiceweb.[employer].com. org and sign in to your [employer] account. Enter H605 in the [employer] box to learn more about \"Learning About Catheter Ablation for Heart Rhythm Problems. \"     If you do not have an account, please click on the \"Sign Up Now\" link. Current as of: December 16, 2019               Content Version: 12.5  © 8158-2272 Healthwise, [employer]. Care instructions adapted under license by [facility] ([facility]). If you have questions about a medical condition or this instruction, always ask your healthcare professional. [place] [address] any warranty or liability for your use of this information. 1. Atrial fibrillation can increase the risk of stroke. Xarelto is to decrease this risk. Afib decreases cardiac output. Rate control (you are on metoprolol), rhythm control (amiodarone-most side effects from long term use), Multaq,(GI side effects) and Tikosyn (requires admission to start), ablation (possibly stop metoprolol and xarelto after 3 months), and pace ablate discussed. Loop recorder discussed. Goal rate is <90 at rest and <110 while doing daily activities. 2. Success and possible need for more than one procedure discussed for ablation. 3.   Printed information about atrial fibrillation and ablation were given. Patient was encouraged to review information given, and call back with any questions. 4.  Continue current medication  5. Let me know how you would like to proceed. 6.  No limitations on activities  7.   Follow up in 6-8 weeks

## 2020-07-01 ENCOUNTER — TELEPHONE (OUTPATIENT)
Dept: PULMONOLOGY | Age: 55
End: 2020-07-01

## 2020-07-01 NOTE — TELEPHONE ENCOUNTER
Within this Telehealth Consent, the terms you and yours refer to the person using the Telehealth Service (Service), or in the case of a use of the Service by or on behalf of a minor, you and yours refer to and include (i) the parent or legal guardian who provides consent to the use of the Service by such minor or uses the Service on behalf of such minor, and (ii) the minor for whom consent is being provided or on whose behalf the Service is being utilized. When using Service, you will be consulting with your health care providers via the use of Telehealth.   Telehealth involves the delivery of healthcare services using electronic communications, information technology or other means between a healthcare provider and a patient who are not in the same physical location. Telehealth may be used for diagnosis, treatment, follow-up and/or patient education, and may include, but is not limited to, one or more of the following:    Electronic transmission of medical records, photo images, personal health information or other data between a patient and a healthcare provider    Interactions between a patient and healthcare provider via audio, video and/or data communications    Use of output data from medical devices, sound and video files    Anticipated Benefits   The use of Telehealth by your Provider(s) through the Service may have the following possible benefits:    Making it easier and more efficient for you to access medical care and treatment for the conditions treated by such Provider(s) utilizing the Service    Allowing you to obtain medical care and treatment by Provider(s) at times that are convenient for you    Enabling you to interact with Provider(s) without the necessity of an in-office appointment     Possible Risks   While the use of Telehealth can provide potential benefits for you, there are also potential risks associated with the use of Telehealth.  These risks include, but may not be limited to the following:    Your Provider(s) may not able to provide medical treatment for your particular condition and you may be required to seek alternative healthcare or emergency care services.  The electronic systems or other security protocols or safeguards used in the Service could fail, causing a breach of privacy of your medical or other information.  Given regulatory requirements in certain jurisdictions, your Provider(s) diagnosis and/or treatment options, especially pertaining to certain prescriptions, may be limited. Acceptance   1. You understand that Services will be provided via Telehealth. This process involves the use of HIPAA compliant and secure, real-time audio-visual interfacing with a qualified and appropriately trained provider located at [facility]. 2. You understand that, under no circumstances, will this session be recorded. 3. You understand that the Provider(s) at [facility] and other clinical participants will be party to the information obtained during the Telehealth session in accordance with best medical practices. 4. You understand that the information obtained during the Telehealth session will be used to help determine the most appropriate treatment options. 5. You understand that You have the right to revoke this consent at any point in time. 6. You understand that Telehealth is voluntary, and that continued treatment is not dependent upon consent. 7. You understand that, in the event of non-consent to Telehealth services and/or technical difficulties, you will obtain services as typically provided in the absence of Telehealth technology. 8. You understand that this consent will be kept in Your medical record. 9. No potential benefits from the use of Telehealth or specific results can be guaranteed. Your condition may not be cured or improved and, in some cases, may get worse.    10. There are limitations in the provision of medical care and treatment via Telehealth and the Service and you may not be able to receive diagnosis and/or treatment through the Service for every condition for which you seek diagnosis and/or treatment. 11. There are potential risks to the use of Telehealth, including but not limited to the risks described in this Telehealth Consent. 12. Your Provider(s) have discussed the use of Telehealth and the Service with you, including the benefits and risks of such and you have provided oral consent to your Provider(s) for the use of Telehealth and the Service. 15. You understand that it is your duty to provide your Provider(s) truthful, accurate and complete information, including all relevant information regarding care that you may have received or may be receiving from other healthcare providers outside of the Service. 14. You understand that each of your Provider(s) may determine in his or sole discretion that your condition is not suitable for diagnosis and/or treatment using the Service, and that you may need to seek medical care and treatment a specialist or other healthcare provider, outside of the Service. 15. You understand that you are fully responsible for payment for all services provided by Provider(s) or through use of the Service and that you may not be able to use third-party insurance. 16. You represent that (a) you have read this Telehealth Consent carefully, (b) you understand the risks and benefits of the Service and the use of Telehealth in the medical care and treatment provided to you by Provider(s) using the Service, and (c) you have the legal capacity and authority to provide this consent for yourself and/or the minor for which you are consenting under applicable federal and state laws, including laws relating to the age of [de-identified] and/or parental/guardian consent.    17. You give your informed consent to the use of Telehealth by Provider(s) using the Service under the terms described in the Terms of Service and this Telehealth Consent. The patient was read the following statement and has consented to the visit as of 7/1/20. The patient has been scheduled for their first telehealth visit on 7/15/20 with .

## 2020-07-02 ENCOUNTER — TELEPHONE (OUTPATIENT)
Dept: CARDIOLOGY CLINIC | Age: 55
End: 2020-07-02

## 2020-07-02 NOTE — TELEPHONE ENCOUNTER
Spoke with patient. Patient is scheduled with Dr. Sixto Dorado for Afib Ablation with Carto and anesthesia on 7/8/20 at [facility], arrival time of 7:30am to the Cath Lab. Please have patient arrive to the main entrance of [facility] and check in with the registration desk. Please call patient regarding medication instructions. Remind patient to be NPO after midnight (8 hours prior). Do not apply lotions/creams on skin the day of procedure. Covid scheduled 7/6.

## 2020-07-06 ENCOUNTER — OFFICE VISIT (OUTPATIENT)
Dept: PRIMARY CARE CLINIC | Age: 55
End: 2020-07-06
Payer: COMMERCIAL

## 2020-07-06 PROCEDURE — 99211 OFF/OP EST MAY X REQ PHY/QHP: CPT | Performed by: NURSE PRACTITIONER

## 2020-07-06 NOTE — PROGRESS NOTES
Sula Kehr received a viral test for COVID-19. They were educated on isolation and quarantine as appropriate. For any symptoms, they were directed to seek care from their PCP, given contact information to establish with a doctor, directed to an urgent care or the emergency room.

## 2020-07-07 LAB — SARS-COV-2, PCR: NOT DETECTED

## 2020-07-08 ENCOUNTER — ANESTHESIA EVENT (OUTPATIENT)
Dept: CARDIAC CATH/INVASIVE PROCEDURES | Age: 55
End: 2020-07-08
Payer: COMMERCIAL

## 2020-07-08 ENCOUNTER — HOSPITAL ENCOUNTER (OUTPATIENT)
Dept: CARDIAC CATH/INVASIVE PROCEDURES | Age: 55
Setting detail: OBSERVATION
Discharge: HOME | End: 2020-07-09
Attending: INTERNAL MEDICINE | Admitting: INTERNAL MEDICINE
Payer: COMMERCIAL

## 2020-07-08 ENCOUNTER — ANESTHESIA (OUTPATIENT)
Dept: CARDIAC CATH/INVASIVE PROCEDURES | Age: 55
End: 2020-07-08
Payer: COMMERCIAL

## 2020-07-08 VITALS — OXYGEN SATURATION: 77 % | DIASTOLIC BLOOD PRESSURE: 59 MMHG | TEMPERATURE: 95.5 F | SYSTOLIC BLOOD PRESSURE: 82 MMHG

## 2020-07-08 PROBLEM — I48.91 A-FIB (HCC): Status: ACTIVE | Noted: 2020-07-08

## 2020-07-08 LAB
A/G RATIO: 1.5 (ref 1.1–2.2)
ABO/RH: NORMAL
ALBUMIN SERPL-MCNC: 4.3 G/DL (ref 3.4–5)
ALP BLD-CCNC: 82 U/L (ref 40–129)
ALT SERPL-CCNC: 23 U/L (ref 10–40)
ANION GAP SERPL CALCULATED.3IONS-SCNC: 12 MMOL/L (ref 3–16)
ANTIBODY SCREEN: NORMAL
AST SERPL-CCNC: 18 U/L (ref 15–37)
BILIRUB SERPL-MCNC: 1.4 MG/DL (ref 0–1)
BUN BLDV-MCNC: 23 MG/DL (ref 7–20)
CALCIUM SERPL-MCNC: 9.2 MG/DL (ref 8.3–10.6)
CHLORIDE BLD-SCNC: 101 MMOL/L (ref 99–110)
CO2: 25 MMOL/L (ref 21–32)
CREAT SERPL-MCNC: 1 MG/DL (ref 0.9–1.3)
EKG ATRIAL RATE: 441 BPM
EKG DIAGNOSIS: NORMAL
EKG Q-T INTERVAL: 386 MS
EKG QRS DURATION: 94 MS
EKG QTC CALCULATION (BAZETT): 416 MS
EKG R AXIS: 13 DEGREES
EKG T AXIS: 15 DEGREES
EKG VENTRICULAR RATE: 70 BPM
GFR AFRICAN AMERICAN: >60
GFR NON-AFRICAN AMERICAN: >60
GLOBULIN: 2.9 G/DL
GLUCOSE BLD-MCNC: 107 MG/DL (ref 70–99)
HCT VFR BLD CALC: 44.9 % (ref 40.5–52.5)
HEMOGLOBIN: 15 G/DL (ref 13.5–17.5)
INR BLD: 1.09 (ref 0.86–1.14)
LV EF: 55 %
LVEF MODALITY: NORMAL
MCH RBC QN AUTO: 28.6 PG (ref 26–34)
MCHC RBC AUTO-ENTMCNC: 33.4 G/DL (ref 31–36)
MCV RBC AUTO: 85.6 FL (ref 80–100)
PDW BLD-RTO: 14.2 % (ref 12.4–15.4)
PLATELET # BLD: 206 K/UL (ref 135–450)
PMV BLD AUTO: 8.7 FL (ref 5–10.5)
POC ACT LR: 242 SEC
POC ACT LR: 268 SEC
POC ACT LR: 303 SEC
POC ACT LR: 305 SEC
POC ACT LR: 317 SEC
POTASSIUM SERPL-SCNC: 4.2 MMOL/L (ref 3.5–5.1)
PROTHROMBIN TIME: 12.6 SEC (ref 10–13.2)
RBC # BLD: 5.24 M/UL (ref 4.2–5.9)
SODIUM BLD-SCNC: 138 MMOL/L (ref 136–145)
TOTAL PROTEIN: 7.2 G/DL (ref 6.4–8.2)
WBC # BLD: 8 K/UL (ref 4–11)

## 2020-07-08 PROCEDURE — 93656 COMPRE EP EVAL ABLTJ ATR FIB: CPT | Performed by: INTERNAL MEDICINE

## 2020-07-08 PROCEDURE — 7100000000 HC PACU RECOVERY - FIRST 15 MIN

## 2020-07-08 PROCEDURE — 2500000003 HC RX 250 WO HCPCS: Performed by: NURSE ANESTHETIST, CERTIFIED REGISTERED

## 2020-07-08 PROCEDURE — 93613 INTRACARDIAC EPHYS 3D MAPG: CPT

## 2020-07-08 PROCEDURE — 6360000002 HC RX W HCPCS: Performed by: NURSE ANESTHETIST, CERTIFIED REGISTERED

## 2020-07-08 PROCEDURE — 80053 COMPREHEN METABOLIC PANEL: CPT

## 2020-07-08 PROCEDURE — 93655 ICAR CATH ABLTJ DSCRT ARRHYT: CPT | Performed by: INTERNAL MEDICINE

## 2020-07-08 PROCEDURE — 93662 INTRACARDIAC ECG (ICE): CPT

## 2020-07-08 PROCEDURE — 93623 PRGRMD STIMJ&PACG IV RX NFS: CPT | Performed by: INTERNAL MEDICINE

## 2020-07-08 PROCEDURE — 93613 INTRACARDIAC EPHYS 3D MAPG: CPT | Performed by: INTERNAL MEDICINE

## 2020-07-08 PROCEDURE — 86923 COMPATIBILITY TEST ELECTRIC: CPT

## 2020-07-08 PROCEDURE — 2580000003 HC RX 258: Performed by: INTERNAL MEDICINE

## 2020-07-08 PROCEDURE — 36415 COLL VENOUS BLD VENIPUNCTURE: CPT

## 2020-07-08 PROCEDURE — 85347 COAGULATION TIME ACTIVATED: CPT

## 2020-07-08 PROCEDURE — 93656 COMPRE EP EVAL ABLTJ ATR FIB: CPT

## 2020-07-08 PROCEDURE — C1769 GUIDE WIRE: HCPCS

## 2020-07-08 PROCEDURE — C1894 INTRO/SHEATH, NON-LASER: HCPCS

## 2020-07-08 PROCEDURE — 3700000001 HC ADD 15 MINUTES (ANESTHESIA)

## 2020-07-08 PROCEDURE — 2580000003 HC RX 258: Performed by: NURSE ANESTHETIST, CERTIFIED REGISTERED

## 2020-07-08 PROCEDURE — C1730 CATH, EP, 19 OR FEW ELECT: HCPCS

## 2020-07-08 PROCEDURE — 6370000000 HC RX 637 (ALT 250 FOR IP): Performed by: INTERNAL MEDICINE

## 2020-07-08 PROCEDURE — 86850 RBC ANTIBODY SCREEN: CPT

## 2020-07-08 PROCEDURE — 6360000002 HC RX W HCPCS

## 2020-07-08 PROCEDURE — 2709999900 HC NON-CHARGEABLE SUPPLY

## 2020-07-08 PROCEDURE — 93655 ICAR CATH ABLTJ DSCRT ARRHYT: CPT

## 2020-07-08 PROCEDURE — 86900 BLOOD TYPING SEROLOGIC ABO: CPT

## 2020-07-08 PROCEDURE — G0378 HOSPITAL OBSERVATION PER HR: HCPCS

## 2020-07-08 PROCEDURE — 93010 ELECTROCARDIOGRAM REPORT: CPT | Performed by: INTERNAL MEDICINE

## 2020-07-08 PROCEDURE — 85027 COMPLETE CBC AUTOMATED: CPT

## 2020-07-08 PROCEDURE — C1759 CATH, INTRA ECHOCARDIOGRAPHY: HCPCS

## 2020-07-08 PROCEDURE — 93662 INTRACARDIAC ECG (ICE): CPT | Performed by: INTERNAL MEDICINE

## 2020-07-08 PROCEDURE — 3700000000 HC ANESTHESIA ATTENDED CARE

## 2020-07-08 PROCEDURE — 2720000010 HC SURG SUPPLY STERILE

## 2020-07-08 PROCEDURE — 85610 PROTHROMBIN TIME: CPT

## 2020-07-08 PROCEDURE — 2500000003 HC RX 250 WO HCPCS

## 2020-07-08 PROCEDURE — 2580000003 HC RX 258

## 2020-07-08 PROCEDURE — 7100000001 HC PACU RECOVERY - ADDTL 15 MIN

## 2020-07-08 PROCEDURE — 86901 BLOOD TYPING SEROLOGIC RH(D): CPT

## 2020-07-08 PROCEDURE — 93005 ELECTROCARDIOGRAM TRACING: CPT | Performed by: INTERNAL MEDICINE

## 2020-07-08 PROCEDURE — C1732 CATH, EP, DIAG/ABL, 3D/VECT: HCPCS

## 2020-07-08 RX ORDER — HEPARIN SODIUM 10000 [USP'U]/100ML
INJECTION, SOLUTION INTRAVENOUS CONTINUOUS PRN
Status: COMPLETED | OUTPATIENT
Start: 2020-07-08 | End: 2020-07-08

## 2020-07-08 RX ORDER — SODIUM CHLORIDE 0.9 % (FLUSH) 0.9 %
10 SYRINGE (ML) INJECTION PRN
Status: DISCONTINUED | OUTPATIENT
Start: 2020-07-08 | End: 2020-07-09 | Stop reason: HOSPADM

## 2020-07-08 RX ORDER — ROCURONIUM BROMIDE 10 MG/ML
INJECTION, SOLUTION INTRAVENOUS PRN
Status: DISCONTINUED | OUTPATIENT
Start: 2020-07-08 | End: 2020-07-08 | Stop reason: SDUPTHER

## 2020-07-08 RX ORDER — PANTOPRAZOLE SODIUM 40 MG/1
40 TABLET, DELAYED RELEASE ORAL
Status: DISCONTINUED | OUTPATIENT
Start: 2020-07-09 | End: 2020-07-09

## 2020-07-08 RX ORDER — PROPOFOL 10 MG/ML
INJECTION, EMULSION INTRAVENOUS PRN
Status: DISCONTINUED | OUTPATIENT
Start: 2020-07-08 | End: 2020-07-08 | Stop reason: SDUPTHER

## 2020-07-08 RX ORDER — METOPROLOL SUCCINATE 50 MG/1
100 TABLET, EXTENDED RELEASE ORAL DAILY
Status: DISCONTINUED | OUTPATIENT
Start: 2020-07-08 | End: 2020-07-09 | Stop reason: HOSPADM

## 2020-07-08 RX ORDER — AMIODARONE HYDROCHLORIDE 200 MG/1
200 TABLET ORAL DAILY
Status: DISCONTINUED | OUTPATIENT
Start: 2020-07-08 | End: 2020-07-09 | Stop reason: HOSPADM

## 2020-07-08 RX ORDER — HEPARIN SODIUM 1000 [USP'U]/ML
INJECTION, SOLUTION INTRAVENOUS; SUBCUTANEOUS
Status: COMPLETED | OUTPATIENT
Start: 2020-07-08 | End: 2020-07-08

## 2020-07-08 RX ORDER — MIDAZOLAM HYDROCHLORIDE 1 MG/ML
INJECTION INTRAMUSCULAR; INTRAVENOUS PRN
Status: DISCONTINUED | OUTPATIENT
Start: 2020-07-08 | End: 2020-07-08 | Stop reason: SDUPTHER

## 2020-07-08 RX ORDER — ONDANSETRON 2 MG/ML
INJECTION INTRAMUSCULAR; INTRAVENOUS PRN
Status: DISCONTINUED | OUTPATIENT
Start: 2020-07-08 | End: 2020-07-08 | Stop reason: SDUPTHER

## 2020-07-08 RX ORDER — FENTANYL CITRATE 50 UG/ML
INJECTION, SOLUTION INTRAMUSCULAR; INTRAVENOUS PRN
Status: DISCONTINUED | OUTPATIENT
Start: 2020-07-08 | End: 2020-07-08 | Stop reason: SDUPTHER

## 2020-07-08 RX ORDER — LIDOCAINE HYDROCHLORIDE 20 MG/ML
INJECTION, SOLUTION EPIDURAL; INFILTRATION; INTRACAUDAL; PERINEURAL PRN
Status: DISCONTINUED | OUTPATIENT
Start: 2020-07-08 | End: 2020-07-08 | Stop reason: SDUPTHER

## 2020-07-08 RX ORDER — OXYCODONE HYDROCHLORIDE AND ACETAMINOPHEN 5; 325 MG/1; MG/1
1 TABLET ORAL EVERY 4 HOURS PRN
Status: DISCONTINUED | OUTPATIENT
Start: 2020-07-08 | End: 2020-07-09 | Stop reason: HOSPADM

## 2020-07-08 RX ORDER — SUCRALFATE 1 G/1
1 TABLET ORAL EVERY 6 HOURS SCHEDULED
Status: DISCONTINUED | OUTPATIENT
Start: 2020-07-09 | End: 2020-07-09 | Stop reason: HOSPADM

## 2020-07-08 RX ORDER — SODIUM CHLORIDE 9 MG/ML
INJECTION, SOLUTION INTRAVENOUS ONCE
Status: COMPLETED | OUTPATIENT
Start: 2020-07-08 | End: 2020-07-08

## 2020-07-08 RX ORDER — SODIUM CHLORIDE 0.9 % (FLUSH) 0.9 %
10 SYRINGE (ML) INJECTION EVERY 12 HOURS SCHEDULED
Status: DISCONTINUED | OUTPATIENT
Start: 2020-07-08 | End: 2020-07-09 | Stop reason: HOSPADM

## 2020-07-08 RX ORDER — ACETAMINOPHEN 325 MG/1
650 TABLET ORAL EVERY 4 HOURS PRN
Status: DISCONTINUED | OUTPATIENT
Start: 2020-07-08 | End: 2020-07-09 | Stop reason: HOSPADM

## 2020-07-08 RX ORDER — PHENYLEPHRINE HCL IN 0.9% NACL 1 MG/10 ML
SYRINGE (ML) INTRAVENOUS PRN
Status: DISCONTINUED | OUTPATIENT
Start: 2020-07-08 | End: 2020-07-08 | Stop reason: SDUPTHER

## 2020-07-08 RX ORDER — DEXAMETHASONE SODIUM PHOSPHATE 4 MG/ML
INJECTION, SOLUTION INTRA-ARTICULAR; INTRALESIONAL; INTRAMUSCULAR; INTRAVENOUS; SOFT TISSUE PRN
Status: DISCONTINUED | OUTPATIENT
Start: 2020-07-08 | End: 2020-07-08 | Stop reason: SDUPTHER

## 2020-07-08 RX ORDER — HYDROMORPHONE HCL 110MG/55ML
0.5 PATIENT CONTROLLED ANALGESIA SYRINGE INTRAVENOUS
Status: DISCONTINUED | OUTPATIENT
Start: 2020-07-08 | End: 2020-07-09 | Stop reason: HOSPADM

## 2020-07-08 RX ADMIN — HEPARIN SODIUM 5000 UNITS: 1000 INJECTION, SOLUTION INTRAVENOUS; SUBCUTANEOUS at 11:15

## 2020-07-08 RX ADMIN — APIXABAN 5 MG: 5 TABLET, FILM COATED ORAL at 20:05

## 2020-07-08 RX ADMIN — LIDOCAINE HYDROCHLORIDE 50 MG: 20 INJECTION, SOLUTION EPIDURAL; INFILTRATION; INTRACAUDAL; PERINEURAL at 09:31

## 2020-07-08 RX ADMIN — DEXAMETHASONE SODIUM PHOSPHATE 8 MG: 4 INJECTION, SOLUTION INTRAMUSCULAR; INTRAVENOUS at 14:16

## 2020-07-08 RX ADMIN — HEPARIN SODIUM 3000 UNITS: 1000 INJECTION, SOLUTION INTRAVENOUS; SUBCUTANEOUS at 11:24

## 2020-07-08 RX ADMIN — SODIUM CHLORIDE: 9 INJECTION, SOLUTION INTRAVENOUS at 08:35

## 2020-07-08 RX ADMIN — MIDAZOLAM HYDROCHLORIDE 2 MG: 2 INJECTION, SOLUTION INTRAMUSCULAR; INTRAVENOUS at 09:18

## 2020-07-08 RX ADMIN — SUCRALFATE 1 G: 1 TABLET ORAL at 22:20

## 2020-07-08 RX ADMIN — PHENYLEPHRINE HYDROCHLORIDE 45 MCG/MIN: 10 INJECTION INTRAVENOUS at 11:06

## 2020-07-08 RX ADMIN — PHENYLEPHRINE HYDROCHLORIDE 30 MCG/MIN: 10 INJECTION INTRAVENOUS at 10:31

## 2020-07-08 RX ADMIN — APIXABAN 5 MG: 5 TABLET, FILM COATED ORAL at 16:31

## 2020-07-08 RX ADMIN — SODIUM CHLORIDE: 9 INJECTION, SOLUTION INTRAVENOUS at 11:07

## 2020-07-08 RX ADMIN — ROCURONIUM BROMIDE 70 MG: 10 SOLUTION INTRAVENOUS at 09:31

## 2020-07-08 RX ADMIN — METOPROLOL SUCCINATE 100 MG: 50 TABLET, EXTENDED RELEASE ORAL at 16:30

## 2020-07-08 RX ADMIN — AMIODARONE HYDROCHLORIDE 200 MG: 200 TABLET ORAL at 16:30

## 2020-07-08 RX ADMIN — PROPOFOL 200 MG: 10 INJECTION, EMULSION INTRAVENOUS at 09:31

## 2020-07-08 RX ADMIN — ROCURONIUM BROMIDE 30 MG: 10 SOLUTION INTRAVENOUS at 10:33

## 2020-07-08 RX ADMIN — Medication 10 ML: at 20:06

## 2020-07-08 RX ADMIN — FENTANYL CITRATE 100 MCG: 50 INJECTION, SOLUTION INTRAMUSCULAR; INTRAVENOUS at 09:31

## 2020-07-08 RX ADMIN — HEPARIN SODIUM 3000 UNITS/HR: 10000 INJECTION, SOLUTION INTRAVENOUS at 11:16

## 2020-07-08 RX ADMIN — ONDANSETRON 4 MG: 2 INJECTION INTRAMUSCULAR; INTRAVENOUS at 14:16

## 2020-07-08 RX ADMIN — OXYCODONE HYDROCHLORIDE AND ACETAMINOPHEN 1 TABLET: 5; 325 TABLET ORAL at 16:30

## 2020-07-08 RX ADMIN — FENTANYL CITRATE 50 MCG: 50 INJECTION, SOLUTION INTRAMUSCULAR; INTRAVENOUS at 10:55

## 2020-07-08 RX ADMIN — HEPARIN SODIUM 3000 UNITS: 1000 INJECTION, SOLUTION INTRAVENOUS; SUBCUTANEOUS at 11:43

## 2020-07-08 RX ADMIN — Medication 80 MCG: at 14:29

## 2020-07-08 ASSESSMENT — PULMONARY FUNCTION TESTS
PIF_VALUE: 26
PIF_VALUE: 27
PIF_VALUE: 23
PIF_VALUE: 26
PIF_VALUE: 23
PIF_VALUE: 27
PIF_VALUE: 26
PIF_VALUE: 2
PIF_VALUE: 26
PIF_VALUE: 27
PIF_VALUE: 23
PIF_VALUE: 27
PIF_VALUE: 26
PIF_VALUE: 27
PIF_VALUE: 1
PIF_VALUE: 2
PIF_VALUE: 27
PIF_VALUE: 24
PIF_VALUE: 26
PIF_VALUE: 24
PIF_VALUE: 27
PIF_VALUE: 2
PIF_VALUE: 22
PIF_VALUE: 22
PIF_VALUE: 26
PIF_VALUE: 23
PIF_VALUE: 26
PIF_VALUE: 26
PIF_VALUE: 27
PIF_VALUE: 26
PIF_VALUE: 27
PIF_VALUE: 26
PIF_VALUE: 27
PIF_VALUE: 26
PIF_VALUE: 3
PIF_VALUE: 26
PIF_VALUE: 21
PIF_VALUE: 27
PIF_VALUE: 26
PIF_VALUE: 25
PIF_VALUE: 26
PIF_VALUE: 26
PIF_VALUE: 27
PIF_VALUE: 1
PIF_VALUE: 26
PIF_VALUE: 27
PIF_VALUE: 1
PIF_VALUE: 26
PIF_VALUE: 27
PIF_VALUE: 25
PIF_VALUE: 26
PIF_VALUE: 26
PIF_VALUE: 28
PIF_VALUE: 26
PIF_VALUE: 8
PIF_VALUE: 26
PIF_VALUE: 26
PIF_VALUE: 27
PIF_VALUE: 27
PIF_VALUE: 26
PIF_VALUE: 27
PIF_VALUE: 27
PIF_VALUE: 22
PIF_VALUE: 26
PIF_VALUE: 22
PIF_VALUE: 26
PIF_VALUE: 27
PIF_VALUE: 25
PIF_VALUE: 21
PIF_VALUE: 26
PIF_VALUE: 27
PIF_VALUE: 27
PIF_VALUE: 26
PIF_VALUE: 25
PIF_VALUE: 1
PIF_VALUE: 26
PIF_VALUE: 27
PIF_VALUE: 26
PIF_VALUE: 22
PIF_VALUE: 26
PIF_VALUE: 26
PIF_VALUE: 23
PIF_VALUE: 25
PIF_VALUE: 22
PIF_VALUE: 27
PIF_VALUE: 26
PIF_VALUE: 2
PIF_VALUE: 24
PIF_VALUE: 27
PIF_VALUE: 26
PIF_VALUE: 22
PIF_VALUE: 27
PIF_VALUE: 21
PIF_VALUE: 24
PIF_VALUE: 27
PIF_VALUE: 26
PIF_VALUE: 27
PIF_VALUE: 23
PIF_VALUE: 17
PIF_VALUE: 27
PIF_VALUE: 26
PIF_VALUE: 25
PIF_VALUE: 27
PIF_VALUE: 24
PIF_VALUE: 26
PIF_VALUE: 0
PIF_VALUE: 26
PIF_VALUE: 26
PIF_VALUE: 19
PIF_VALUE: 27
PIF_VALUE: 26
PIF_VALUE: 27
PIF_VALUE: 1
PIF_VALUE: 27
PIF_VALUE: 20
PIF_VALUE: 26
PIF_VALUE: 26
PIF_VALUE: 24
PIF_VALUE: 26
PIF_VALUE: 27
PIF_VALUE: 24
PIF_VALUE: 27
PIF_VALUE: 23
PIF_VALUE: 27
PIF_VALUE: 21
PIF_VALUE: 23
PIF_VALUE: 19
PIF_VALUE: 27
PIF_VALUE: 22
PIF_VALUE: 22
PIF_VALUE: 21
PIF_VALUE: 22
PIF_VALUE: 26
PIF_VALUE: 23
PIF_VALUE: 23
PIF_VALUE: 27
PIF_VALUE: 24
PIF_VALUE: 27
PIF_VALUE: 27
PIF_VALUE: 26
PIF_VALUE: 22
PIF_VALUE: 24
PIF_VALUE: 26
PIF_VALUE: 27
PIF_VALUE: 26
PIF_VALUE: 26
PIF_VALUE: 27
PIF_VALUE: 27
PIF_VALUE: 18
PIF_VALUE: 28
PIF_VALUE: 26
PIF_VALUE: 26
PIF_VALUE: 23
PIF_VALUE: 27
PIF_VALUE: 23
PIF_VALUE: 26
PIF_VALUE: 27
PIF_VALUE: 27
PIF_VALUE: 26
PIF_VALUE: 27
PIF_VALUE: 24
PIF_VALUE: 26
PIF_VALUE: 27
PIF_VALUE: 0
PIF_VALUE: 26
PIF_VALUE: 26
PIF_VALUE: 2
PIF_VALUE: 27
PIF_VALUE: 26
PIF_VALUE: 0
PIF_VALUE: 26
PIF_VALUE: 23
PIF_VALUE: 26
PIF_VALUE: 25
PIF_VALUE: 27
PIF_VALUE: 26
PIF_VALUE: 27
PIF_VALUE: 27
PIF_VALUE: 23
PIF_VALUE: 27
PIF_VALUE: 26
PIF_VALUE: 26
PIF_VALUE: 23
PIF_VALUE: 26
PIF_VALUE: 25
PIF_VALUE: 23
PIF_VALUE: 27
PIF_VALUE: 2
PIF_VALUE: 8
PIF_VALUE: 27
PIF_VALUE: 25
PIF_VALUE: 23
PIF_VALUE: 26
PIF_VALUE: 26
PIF_VALUE: 25
PIF_VALUE: 20
PIF_VALUE: 26
PIF_VALUE: 26
PIF_VALUE: 21
PIF_VALUE: 26
PIF_VALUE: 21
PIF_VALUE: 26
PIF_VALUE: 23
PIF_VALUE: 25
PIF_VALUE: 24
PIF_VALUE: 26
PIF_VALUE: 22
PIF_VALUE: 23
PIF_VALUE: 24
PIF_VALUE: 28
PIF_VALUE: 27
PIF_VALUE: 22
PIF_VALUE: 26
PIF_VALUE: 26
PIF_VALUE: 23
PIF_VALUE: 22
PIF_VALUE: 26
PIF_VALUE: 25
PIF_VALUE: 26
PIF_VALUE: 1
PIF_VALUE: 25
PIF_VALUE: 26
PIF_VALUE: 24
PIF_VALUE: 10
PIF_VALUE: 26
PIF_VALUE: 23
PIF_VALUE: 27
PIF_VALUE: 24
PIF_VALUE: 18
PIF_VALUE: 26
PIF_VALUE: 26
PIF_VALUE: 27
PIF_VALUE: 27
PIF_VALUE: 22
PIF_VALUE: 27
PIF_VALUE: 25
PIF_VALUE: 26
PIF_VALUE: 27
PIF_VALUE: 24
PIF_VALUE: 23
PIF_VALUE: 26
PIF_VALUE: 22
PIF_VALUE: 25
PIF_VALUE: 25
PIF_VALUE: 26
PIF_VALUE: 26
PIF_VALUE: 27
PIF_VALUE: 4
PIF_VALUE: 27
PIF_VALUE: 22
PIF_VALUE: 24
PIF_VALUE: 26
PIF_VALUE: 22
PIF_VALUE: 26
PIF_VALUE: 27
PIF_VALUE: 22
PIF_VALUE: 27
PIF_VALUE: 15
PIF_VALUE: 24
PIF_VALUE: 27
PIF_VALUE: 0
PIF_VALUE: 25
PIF_VALUE: 26
PIF_VALUE: 26
PIF_VALUE: 27
PIF_VALUE: 27
PIF_VALUE: 26
PIF_VALUE: 25
PIF_VALUE: 25
PIF_VALUE: 23
PIF_VALUE: 22
PIF_VALUE: 23
PIF_VALUE: 27
PIF_VALUE: 23
PIF_VALUE: 26
PIF_VALUE: 21
PIF_VALUE: 24

## 2020-07-08 ASSESSMENT — LIFESTYLE VARIABLES: SMOKING_STATUS: 0

## 2020-07-08 ASSESSMENT — PAIN SCALES - GENERAL
PAINLEVEL_OUTOF10: 7
PAINLEVEL_OUTOF10: 0

## 2020-07-08 NOTE — ANESTHESIA POSTPROCEDURE EVALUATION
Department of Anesthesiology  Postprocedure Note    Patient: [place]  MRN: [id number]  YOB: 1965  Date of evaluation: 7/8/2020    Procedure Summary     Date:  07/08/20 Room / Location:  [facility] Cardiac Cath Lab    Anesthesia Start:  0919 Anesthesia Stop:  1459    Procedure:  ABLATION Diagnosis:       Unspecified atrial fibrillation      A-fib ([address]      Unspecified atrial fibrillation    Scheduled Providers:   Responsible Provider:  Sundeep Eid MD    Anesthesia Type:  general ASA Status:  3        Anesthesia Type: general    Last vitals: Reviewed and per EMR flowsheets.      Anesthesia Post Evaluation   Anesthetic Problems: no   Cardiovascular System Stable: yes  Respiratory Function: Airway Patent yes  ETT no  Ventilator no  Level of consciousness: awake, alert and oriented  Post-op pain: adequate analgesia  Hydration Adequate: yes  Nausea/Vomiting:no  Other Issues:     Ivan Soria MD

## 2020-07-08 NOTE — PROCEDURES
Electrophysiology Procedure Report    Attending   Tilda Frankel, MD    Procedures Atrial fibrillation ablation  Diagnostic electrophysiologic study  3-D electroanatomical mapping of the left atrium and right atrium  Transseptal catheterization  Wide area circumferential ablation with PV isolation  Ablation of typical atrial flutter  Intra-cardiac echocardiography  MARTINEZ    Indications  Symptomatic paroxysmal atrial fibrillation    Description of Procedure   After the risks and benefits of the procedure were explained and informed consent was obtained, the patient was brought to the electrophysiology lab in the nonsedated and fasting state. General anesthesia was administered under anesthesia physician/nurse guidance (please see anesthesia records for medication details). MARTINEZ was performed because patient missed doses of anticoagulation prior to procedure. No left atrial appendage thrombus. Romero catheter was not placed prior to the start of the procedure. The patient was connected to the EP lab monitoring system. The patient was prepared and draped in standard fashion. Using the modified Seldinger technique, the following sheaths and catheters were inserted under ultrasound guidance:    1. A 8 F sheath was inserted into the right femoral vein. This sheath was exchanged for SL1 sheath. A BRK1 needled was inserted into the SL1 sheath. This was advanced bing the the left atrium via the modified Brockenbrough procedure for trans-septal puncture under ultrasound and fluoroscopy guidance however we were unable to reach septum. The SL1 was exchanged for an Agylis sheath. The modified Brockenbrough procedure for trans-septal punture under ultrasound and fluoroscopy guidance using BRK1 needle (long needle). The Agylis sheath was pulled back into the right atrium. The ablation catheter was inserted into the SL1 after exchange for second 8 F sheath.   Using over the wire technique the SL1 was advanced into the left atrium. The Agylis sheath was advanced over wire into the left atrium. The ablation catheter was advanced into the left atrium for mapping and ablation. E    2. A 8 F sheath was inserted into the right femoral vein. This sheath was exchanged for SL1 sheath. The SL1 was advanced into the left atrium as described above. The Pentaray was advanced into the left atrium via the SL1 sheath. 3.  A 8 F sheath was inserted into the left femoral vein. Uni-directional multipolar catheter was inserted via this sheath into the right atrium and coronary sinus for mapping and pacing. 4.  A 9 F sheath was inserted into the left femoral vein. Via this sheath the ICE catheter was advanced into the right atrium and right ventricle for transseptal, mapping, and functional/mechanical evaluation during ablation. The patient arrived in atrial fibrillation. Given the patient's history of atrial fibrillation without atrial flutter no CTI was performed. Right atrial anatomy was generated using decanav CS catheter and Carto Mapping system (3D). Prior to transseptal catheterization, IV heparin was admistered for a target ACT >300 for the duration of the procedure. Transseptal catheterization was performed under intracardiac echocardiographic guidance. The PVs were mapped with a Pentray catheter using Carto Mapping system, demonstrating conduction into the PVs.  WACA was performed and bilateral PVAI was achieved with bilateral birgit line (of note, right anterior isaac line was not performed because of phrenic stimulation). The PVs were remapped with the Pentray catheter and demonstrated entrance and exit block. Adenosine testing revealed no latent/recurrent PV conduction. After a 30 minute waiting period, confirmation of PV isolation with exit and entrance block was performed in all 4 PVs.  After isolation of left pulmonary vein patient went into typical flutter based on EKG.   Entrainment mapping in left and right atrium confirmed typical flutter. After determining that the cavotricuspid isthmus was in the circuit, the decision to proceed with the cavotricuspid isthmus ablation was made. The ablation catheter was positioned across the isthmus, and a linear ablation was performed from the AV groove through the cavotricuspid isthmus to the insertion of the inferior vena cava (confirmed by Carto and ICE). During the application of energy, atrial flutter was terminated. Pacing was performed at both sides of the isthmus ablation site and revealed bidirectional conduction block across the isthmus. Electrophysiology study was performed/attempted (see below). Catheters were removed (figure of 8 stitch bilaterally) and heparin was reversed per standard protocol. The esophagus was located in close proximity direct right PV, and in this region the maximal power delivered was 30W and the maximal temperature was 37.5 C. The patient was allowed to awake from anesthesia and was returned to prep and recovery for further recovery. Dr. Jameson Dorado MD, the attending physician, was present throughout the procedure and for interpretation of the data. Complications  none  EBL   <50cc    RESULTS    ECG   Baseline   Atrial flutter       Post Ablation   normal sinus rhythm with nonspecific ST-T wave abnormalities    RESULTS  Basic Intervals  Status Rhythm PA AH HV IN QRS QT RR   Post NSR - - - 133 93 380 798     Stimulation measurements  2 to 1 CL Wenck CL Dual AVN  Pathway? VA conduction SVT Comment    440 - - - Junctional Post Decremental pacing     Refractory Periods  PCL A ERP PCL AVN ERP PCL V ERP PCL V ERP  PCL Access path ERP Comment   - - - - - - - - - - -     Supraventricular Arryhthmia(s) induced  Arrhythmia Induced Arrhythmia CL Duration Term How Induced? Morphology AXIS Comment   none            Ventricular Arrhythmia(s) induced  Arrhythmia induced Arrhythmia CL Duration Term How Induced?  Morphology AXIS VT- Transition

## 2020-07-08 NOTE — ANESTHESIA PRE PROCEDURE
Department of Anesthesiology  Preprocedure Note       Name:  Sula Kehr   Age:  53 y.o.  :  1965                                          MRN:  [id number]         Date:  2020      Surgeon: * Surgery not found *    Procedure:     Medications prior to admission:   Prior to Admission medications    Medication Sig Start Date End Date Taking? Authorizing Provider   apixaban (ELIQUIS) 5 MG TABS tablet Take 5 mg by mouth 2 times daily   Yes Historical Provider, MD   metoprolol succinate (TOPROL XL) 100 MG extended release tablet Take 1 tablet by mouth daily 20  Yes Jesus Hernández MD       Current medications:    Current Facility-Administered Medications   Medication Dose Route Frequency Provider Last Rate Last Dose    0.9 % sodium chloride infusion   Intravenous Once Myrlene Riedel., MD           Allergies:  No Known Allergies    Problem List:    Patient Active Problem List   Diagnosis Code    Hypotestosteronism E34.9    Erectile dysfunction N52.9    Obesity E66.9    Atrial fibrillation ([address] I48.91    Other fatigue R53.83       Past Medical History:  History reviewed. No pertinent past medical history. Past Surgical History:  History reviewed. No pertinent surgical history.     Social History:    Social History     Tobacco Use    Smoking status: Never Smoker    Smokeless tobacco: Never Used   Substance Use Topics    Alcohol use: Yes     Frequency: 2-3 times a week     Binge frequency: Never                                Counseling given: Not Answered      Vital Signs (Current):   Vitals:    20 0855   Weight: 266 lb 12.8 oz (121 kg)   Height: 6' 1\" (1.854 m)                                              BP Readings from Last 3 Encounters:   20 90/60   20 (!) 98/59   20 113/76       NPO Status: Time of last liquid consumption: 2300                        Time of last solid consumption: 2300                                                      BMI:   Wt Readings fibrillation,     (-) past MI    NYHA Classification: I  ECG reviewed  Rhythm: irregular  Rate: normal  Echocardiogram reviewed         Beta Blocker:  No for medical reason      ROS comment: Echo  EF 55%  Normal valves       Neuro/Psych:               GI/Hepatic/Renal:   (+) morbid obesity     (-) GERD       Endo/Other: Negative Endo/Other ROS                    Abdominal:   (+) obese,         Vascular:                                        Anesthesia Plan      general     ASA 3       Induction: intravenous. MIPS: Prophylactic antiemetics administered. Anesthetic plan and risks discussed with patient and spouse. Plan discussed with CRNA.     Attending anesthesiologist reviewed and agrees with Pre Eval content              Lieutenant Galeazzi, DO   7/8/2020

## 2020-07-08 NOTE — PROGRESS NOTES
Pt arrived to PACU in stable condition. Bilateral groin sites, soft and intact. Report received and placed on monitor.

## 2020-07-09 VITALS
RESPIRATION RATE: 16 BRPM | SYSTOLIC BLOOD PRESSURE: 110 MMHG | BODY MASS INDEX: 36.25 KG/M2 | OXYGEN SATURATION: 99 % | WEIGHT: 273.5 LBS | HEART RATE: 74 BPM | DIASTOLIC BLOOD PRESSURE: 71 MMHG | TEMPERATURE: 97.5 F | HEIGHT: 73 IN

## 2020-07-09 PROBLEM — I45.10 RBBB: Status: ACTIVE | Noted: 2020-07-09

## 2020-07-09 PROBLEM — I48.0 PAROXYSMAL ATRIAL FIBRILLATION (HCC): Status: ACTIVE | Noted: 2020-05-19

## 2020-07-09 LAB
ANION GAP SERPL CALCULATED.3IONS-SCNC: 9 MMOL/L (ref 3–16)
BUN BLDV-MCNC: 17 MG/DL (ref 7–20)
CALCIUM SERPL-MCNC: 8.6 MG/DL (ref 8.3–10.6)
CHLORIDE BLD-SCNC: 104 MMOL/L (ref 99–110)
CO2: 25 MMOL/L (ref 21–32)
CREAT SERPL-MCNC: 1 MG/DL (ref 0.9–1.3)
EKG ATRIAL RATE: 71 BPM
EKG DIAGNOSIS: NORMAL
EKG P AXIS: 46 DEGREES
EKG P-R INTERVAL: 170 MS
EKG Q-T INTERVAL: 432 MS
EKG QRS DURATION: 130 MS
EKG QTC CALCULATION (BAZETT): 469 MS
EKG R AXIS: 47 DEGREES
EKG T AXIS: 0 DEGREES
EKG VENTRICULAR RATE: 71 BPM
GFR AFRICAN AMERICAN: >60
GFR NON-AFRICAN AMERICAN: >60
GLUCOSE BLD-MCNC: 155 MG/DL (ref 70–99)
HCT VFR BLD CALC: 39.8 % (ref 40.5–52.5)
HEMOGLOBIN: 13.1 G/DL (ref 13.5–17.5)
MCH RBC QN AUTO: 28.5 PG (ref 26–34)
MCHC RBC AUTO-ENTMCNC: 32.9 G/DL (ref 31–36)
MCV RBC AUTO: 86.9 FL (ref 80–100)
PDW BLD-RTO: 14.6 % (ref 12.4–15.4)
PLATELET # BLD: 186 K/UL (ref 135–450)
PMV BLD AUTO: 8.7 FL (ref 5–10.5)
POTASSIUM SERPL-SCNC: 4.8 MMOL/L (ref 3.5–5.1)
RBC # BLD: 4.58 M/UL (ref 4.2–5.9)
SODIUM BLD-SCNC: 138 MMOL/L (ref 136–145)
WBC # BLD: 15.6 K/UL (ref 4–11)

## 2020-07-09 PROCEDURE — 93010 ELECTROCARDIOGRAM REPORT: CPT | Performed by: INTERNAL MEDICINE

## 2020-07-09 PROCEDURE — 6370000000 HC RX 637 (ALT 250 FOR IP): Performed by: INTERNAL MEDICINE

## 2020-07-09 PROCEDURE — 80048 BASIC METABOLIC PNL TOTAL CA: CPT

## 2020-07-09 PROCEDURE — G0378 HOSPITAL OBSERVATION PER HR: HCPCS

## 2020-07-09 PROCEDURE — 99217 PR OBSERVATION CARE DISCHARGE MANAGEMENT: CPT | Performed by: NURSE PRACTITIONER

## 2020-07-09 PROCEDURE — 93005 ELECTROCARDIOGRAM TRACING: CPT | Performed by: INTERNAL MEDICINE

## 2020-07-09 PROCEDURE — 85027 COMPLETE CBC AUTOMATED: CPT

## 2020-07-09 PROCEDURE — 2580000003 HC RX 258: Performed by: INTERNAL MEDICINE

## 2020-07-09 RX ORDER — AMIODARONE HYDROCHLORIDE 200 MG/1
200 TABLET ORAL DAILY
Qty: 30 TABLET | Refills: 2 | Status: SHIPPED | OUTPATIENT
Start: 2020-07-10 | End: 2020-09-10

## 2020-07-09 RX ORDER — PANTOPRAZOLE SODIUM 40 MG/1
40 TABLET, DELAYED RELEASE ORAL
Qty: 60 TABLET | Refills: 0 | Status: SHIPPED | OUTPATIENT
Start: 2020-07-09 | End: 2020-07-28 | Stop reason: SDUPTHER

## 2020-07-09 RX ORDER — PANTOPRAZOLE SODIUM 40 MG/1
40 TABLET, DELAYED RELEASE ORAL
Qty: 60 TABLET | Refills: 0 | Status: CANCELLED | OUTPATIENT
Start: 2020-07-09 | End: 2020-08-08

## 2020-07-09 RX ORDER — SUCRALFATE 1 G/1
1 TABLET ORAL 4 TIMES DAILY
Qty: 56 TABLET | Refills: 0 | Status: SHIPPED | OUTPATIENT
Start: 2020-07-09 | End: 2020-07-10 | Stop reason: SDUPTHER

## 2020-07-09 RX ORDER — PANTOPRAZOLE SODIUM 40 MG/1
40 TABLET, DELAYED RELEASE ORAL
Status: DISCONTINUED | OUTPATIENT
Start: 2020-07-09 | End: 2020-07-09 | Stop reason: HOSPADM

## 2020-07-09 RX ADMIN — PANTOPRAZOLE SODIUM 40 MG: 40 TABLET, DELAYED RELEASE ORAL at 05:33

## 2020-07-09 RX ADMIN — APIXABAN 5 MG: 5 TABLET, FILM COATED ORAL at 08:09

## 2020-07-09 RX ADMIN — Medication 10 ML: at 08:10

## 2020-07-09 RX ADMIN — SUCRALFATE 1 G: 1 TABLET ORAL at 05:32

## 2020-07-09 RX ADMIN — METOPROLOL SUCCINATE 100 MG: 50 TABLET, EXTENDED RELEASE ORAL at 08:09

## 2020-07-09 RX ADMIN — AMIODARONE HYDROCHLORIDE 200 MG: 200 TABLET ORAL at 08:09

## 2020-07-09 NOTE — PROGRESS NOTES
Pt discharged home per order. IV removed, scripts picked up in outpatient RX , meds reviewed. Pt fully understand d/c instructions.

## 2020-07-09 NOTE — PLAN OF CARE
Problem: Safety:  Goal: Free from accidental physical injury  Description: Free from accidental physical injury  7/9/2020 1039 by Jean-Pierre Hackett RN  Outcome: Ongoing  7/9/2020 0934 by Jean-Pierre Hackett RN  Outcome: Ongoing  Goal: Free from intentional harm  Description: Free from intentional harm  Outcome: Ongoing

## 2020-07-09 NOTE — FLOWSHEET NOTE
07/08/20 2007   Vital Signs   Temp 97.7 °F (36.5 °C)   Pulse 69   BP 98/72   MAP (mmHg) 81   Level of Consciousness 0   Oxygen Therapy   SpO2 98 %   Shift assessment complete; see flow sheet. Scheduled medications administered; see MAR. Call light and bedside table within reach, bed in low, locked position, side rails up, pt encouraged to call for assistance with ambulation or transfer. Pt denies further needs at this time. Nurse and staff will continue to monitor throughout shift.

## 2020-07-09 NOTE — CARE COORDINATION
SW noted that the Pt had a DC order on his chart. SW met with the Pt and his wife at bedside. Pt reported that he recently moved to [place] with his job. Pt said that his Apple watch was warning him that he ws having irregular heart rhythms. Pt reported that this encouraged him to get medical assistance and get PCP. Pt reported that he does have a procedure scheduled with his Cardiologist.  Pt reported [employer] and that he works full time. Pt is denying any DCP needs. BERNADETTE verified with the PT JALEN Solorio that the Pt does not have needs. Please advise if needs arise.   CHANG Moreno

## 2020-07-09 NOTE — DISCHARGE SUMMARY
Patient ID:  Saurav Kenyon  [id number]  53 y.o.  1965    Admit date: 7/8/2020    Discharge date and time: 7/9/2020    Admitting Physician: Patti Rogers MD     Discharge NP: PANTERA Oconnor    Admission Diagnoses: Unspecified atrial fibrillation [I48.91]  A-fib [facility]) [I48.91]    Discharge Diagnoses: SAME    Admission Condition: fair    Discharged Condition: good    Hospital Course: Saurav Kenyon was admitted on 7/8/2020 and had an elective RFCA of persistent atrial fibrillation and typical atrial flutter. Rhythm has been sinus. He complains of mild chest tenderness. Denies palpitations, SOB, and dizziness. He has eaten, ambulated, and voided.      Assessment:   Persistent atrial arrhythmias (AF and AFL): improved   -s/p RFCA of persistent afib and typical atrial flutter 7/8/2020   -[id number] score 0  RBBB: stable  Obesity: diet, exercise, and weight loss is recommended       Plan:   Continue Toprol and Eliquis  Amiodarone 200mg po QD x3 months then wean off per Dr. Dwaine Holden   Protonix 40mg po BID x4 weeks  Carafate 1gm po QID x2 weeks  Post procedure instructions reviewed    Discharge Exam:  /71   Pulse 74   Temp 97.5 °F (36.4 °C) (Oral)   Resp 16   Ht 6' 1\" (1.854 m)   Wt 273 lb 8 oz (124.1 kg)   SpO2 99%   BMI 36.08 kg/m²     General Appearance:    Alert, cooperative, no distress, appears stated age   Head:    Normocephalic, without obvious abnormality, atraumatic   Eyes:    PERRL, conjunctiva/corneas clear, EOM's intact        Ears:    deferred   Nose:   Nares normal, septum midline, mucosa normal, no drainage    or sinus tenderness   Throat:   Lips, mucosa, and tongue normal; teeth and gums normal   Neck:   Supple, symmetrical, trachea midline, no adenopathy;        thyroid:  No enlargement/tenderness/nodules; no carotid    bruit or JVD   Back:     Symmetric, no curvature, ROM normal, no CVA tenderness   Lungs:     Clear to auscultation bilaterally, respirations unlabored   Chest wall:    No tenderness or deformity   Heart:    Regular rate and rhythm, S1 and S2 normal, no murmur, rub   or gallop; NSR on tele   Abdomen:     Soft, non-tender, bowel sounds active all four quadrants,     no masses, no organomegaly   Genitalia:    deferred   Rectal:    deferred    Extremities:   Extremities normal, atraumatic, no cyanosis or edema; right bilateral femoral sites stable (no sutures present)    Pulses:   2+ and symmetric all extremities   Skin:   Skin color, texture, turgor normal, no rashes or lesions   Lymph nodes:   Cervical, supraclavicular, and axillary nodes normal   Neurologic:   CNII-XII intact. Normal strength, sensation and reflexes       throughout     Disposition: home    Patient Instructions:   Current Discharge Medication List      START taking these medications    Details   amiodarone (CORDARONE) 200 MG tablet Take 1 tablet by mouth daily  Qty: 30 tablet, Refills: 2      sucralfate (CARAFATE) 1 GM tablet Take 1 tablet by mouth 4 times daily for 14 days  Qty: 56 tablet, Refills: 0      pantoprazole (PROTONIX) 40 MG tablet Take 1 tablet by mouth 2 times daily (before meals)  Qty: 60 tablet, Refills: 0         CONTINUE these medications which have NOT CHANGED    Details   apixaban (ELIQUIS) 5 MG TABS tablet Take 5 mg by mouth 2 times daily      metoprolol succinate (TOPROL XL) 100 MG extended release tablet Take 1 tablet by mouth daily  Qty: 90 tablet, Refills: 3    Associated Diagnoses: Atrial fibrillation, unspecified type ([facility]             Post procedure instructions given  Activity: as tolerated  Diet: cardiac diet    Follow up in office on 9/10/2020.     KANDICE Galarza-LORENZO [address]  [phone]

## 2020-07-10 RX ORDER — SUCRALFATE 1 G/1
1 TABLET ORAL 4 TIMES DAILY
Qty: 56 TABLET | Refills: 0 | Status: SHIPPED | OUTPATIENT
Start: 2020-07-10 | End: 2020-07-15

## 2020-07-11 LAB
BLOOD BANK DISPENSE STATUS: NORMAL
BLOOD BANK DISPENSE STATUS: NORMAL
BLOOD BANK PRODUCT CODE: NORMAL
BLOOD BANK PRODUCT CODE: NORMAL
BPU ID: NORMAL
BPU ID: NORMAL
DESCRIPTION BLOOD BANK: NORMAL
DESCRIPTION BLOOD BANK: NORMAL

## 2020-07-15 ENCOUNTER — VIRTUAL VISIT (OUTPATIENT)
Dept: PULMONOLOGY | Age: 55
End: 2020-07-15
Payer: COMMERCIAL

## 2020-07-15 ENCOUNTER — TELEPHONE (OUTPATIENT)
Dept: PULMONOLOGY | Age: 55
End: 2020-07-15

## 2020-07-15 PROCEDURE — 99203 OFFICE O/P NEW LOW 30 MIN: CPT | Performed by: INTERNAL MEDICINE

## 2020-07-15 ASSESSMENT — SLEEP AND FATIGUE QUESTIONNAIRES
ESS TOTAL SCORE: 1
HOW LIKELY ARE YOU TO NOD OFF OR FALL ASLEEP WHILE LYING DOWN TO REST IN THE AFTERNOON WHEN CIRCUMSTANCES PERMIT: 0
HOW LIKELY ARE YOU TO NOD OFF OR FALL ASLEEP WHILE SITTING INACTIVE IN A PUBLIC PLACE: 0
HOW LIKELY ARE YOU TO NOD OFF OR FALL ASLEEP IN A CAR, WHILE STOPPED FOR A FEW MINUTES IN TRAFFIC: 0
HOW LIKELY ARE YOU TO NOD OFF OR FALL ASLEEP WHILE SITTING QUIETLY AFTER LUNCH WITHOUT ALCOHOL: 0
HOW LIKELY ARE YOU TO NOD OFF OR FALL ASLEEP WHEN YOU ARE A PASSENGER IN A CAR FOR AN HOUR WITHOUT A BREAK: 0
HOW LIKELY ARE YOU TO NOD OFF OR FALL ASLEEP WHILE SITTING AND READING: 0
HOW LIKELY ARE YOU TO NOD OFF OR FALL ASLEEP WHILE WATCHING TV: 1
HOW LIKELY ARE YOU TO NOD OFF OR FALL ASLEEP WHILE SITTING AND TALKING TO SOMEONE: 0

## 2020-07-15 NOTE — PROGRESS NOTES
[facility]                                                          TELEHEALTH EVALUATION: Service performed was Audio/Visual (During UBZCB-63 public health emergency) and not a face-to-face visit           CHIEF COMPLAINT: Fatigue    Consulting provider: Chris Cerna MD    HPI:   63years old with history of persistent A. fib and palpitation here for fatigue evaluation 6 months. Mild in general. Never rested in am when he wakes up. Better with activities and worse with sitting still. Associated with daytime sleepiness and just wants to take a nap. Maybe mild snoring at night. + witnessed apnea. Bedtime 10-10:30 pm and rise time is 6 am. Sleep onset 60 minutes. 0-1 nocturia. No naps. No headache in am. No car wrecks or near wrecks because of the sleepiness. No nodding off while driving. Drinks 0-1 caffinated beverages per day. PMH:   Afib     Past Surgical History:    History reviewed. No pertinent surgical history. Allergies:  has No Known Allergies. Social History:    TOBACCO:   reports that he has never smoked. He has never used smokeless tobacco.  ETOH:   reports current alcohol use.       Family History:   No FH for SHARRI       Current Medications:    Current Outpatient Medications:     amiodarone (CORDARONE) 200 MG tablet, Take 1 tablet by mouth daily, Disp: 30 tablet, Rfl: 2    pantoprazole (PROTONIX) 40 MG tablet, Take 1 tablet by mouth 2 times daily (before meals), Disp: 60 tablet, Rfl: 0    apixaban (ELIQUIS) 5 MG TABS tablet, Take 5 mg by mouth 2 times daily, Disp: , Rfl:     metoprolol succinate (TOPROL XL) 100 MG extended release tablet, Take 1 tablet by mouth daily, Disp: 90 tablet, Rfl: 3      REVIEW OF SYSTEMS:  Constitutional: Negative for fever  HENT: Negative for sore throat  Eyes: Negative for redness   Respiratory: Negative for dyspnea, cough  Cardiovascular: Negative for chest pain  Gastrointestinal: Negative for vomiting, diarrhea   Genitourinary: Negative for hematuria   Musculoskeletal: Negative for arthralgias   Skin: Negative for rash  Neurological: Negative for syncope  Hematological: Negative for adenopathy  Psychiatric/Behavorial: Negative for anxiety      Objective:   PHYSICAL EXAM:    There were no vitals taken for this visit.' on RA  O2 Sat:  HR:  BP:  RR:  Neck size: 17.5 inches   Mallampati class IV. Temperature:  Constitutional:  No acute distress. Appears well developed and nourished. Eyes: No sclera icterus. EOM intact. No visible discharge. HENT: Head is normocephalic and atraumatic. Mucus membranes are moist and the tongue appears normal. Normal appearing nose. External Ears normal.   Neck: No visualized mass. Garth Bia is midline   Resp: No accessory muscle use. Respiratory effort normal. No visualized signs of difficulty breathing or respiratory distress. Cardiovascular: No LE edema. Musculoskeletal: Normal gait with no signs of ataxia. Normal range of motion of the neck. Skin: No significant exanthematous lesions or discoloration noted on facial skin    Neuro: Awake. Alert. Able to follow commands. No facial asymmetry. No gaze palsy. Psych: No agitation. Normal affect. No hallucinations. Oriented to person/time/place. No anxiety. Normal judgement and insight. DATA reviewed by me:   Echo 7/8/20   Limited exam for evaluation of the left atrial appendage. Left ventricular systolic function is normal with an estimated ejection   fraction of 55%. There is no evidence of mass or thrombus in the left atrium or appendage. Assessment:       · Snoring  · Observed sleep apnea   · Fatigue   · Persistent A. fib on palpitation post attempted cardioversion and underwent ablation followed by cardiology      Plan:        HST evaluate for sleep related breathing disorder.     Treatment options were discussed with patient if HST reveals SHARRI, including CPAP therapy, oral appliances, upper airway surgery and hypoglossal nerve stimulation.  APAP min pressure of 8 and max pressure of 16 cmH2O if positive HST    Discussed with patient the pathophysiology of apnea.  Sleep hygiene   Avoid sedatives, alcohol and caffeinated drinks at bed time.  No driving motorized vehicles or operating heavy machinery while fatigue, drowsy or sleepy.  Weight loss is also recommended as a long-term intervention.  Complications of SHARRI if not treated were discussed with patient patient to include systemic hypertension, pulmonary hypertension, cardiovascular morbidities, car accidents and all cause mortality. Cynthia Randle is a 63 y.o. male being evaluated by a Virtual Visit (video visit) encounter to address concerns as mentioned above. A caregiver was present when appropriate. Due to this being a TeleHealth encounter (During TEN-21 public health emergency), evaluation of the following organ systems was limited: Vitals/Constitutional/EENT/Resp/CV/GI//MS/Neuro/Skin/Heme-Lymph-Imm. Pursuant to the emergency declaration under the [address] [place] [address], [address] [place] [address] authority and the [employer] Act, this Virtual Visit was conducted with patient's (and/or legal guardian's) consent, to reduce the patient's risk of exposure to COVID-19 and provide necessary medical care. The patient (and/or legal guardian) has also been advised to contact this office for worsening conditions or problems, and seek emergency medical treatment and/or call 911 if deemed necessary. Services were provided through a video synchronous discussion virtually to substitute for in-person clinic visit. Patient was located in her home, provider was located in his office. --Austin Chavez MD on 7/15/2020 at 8:46 AM    An electronic signature was used to authenticate this note.

## 2020-07-15 NOTE — TELEPHONE ENCOUNTER
L/m for pt to call to schedule HST and give f/u appt info. I scheduled f/u with Courtney Calixto if time works for the pt.  He did not answer for after visit plan info

## 2020-07-22 ENCOUNTER — HOSPITAL ENCOUNTER (OUTPATIENT)
Dept: SLEEP CENTER | Age: 55
Discharge: HOME | End: 2020-07-24
Payer: COMMERCIAL

## 2020-07-22 PROCEDURE — 95806 SLEEP STUDY UNATT&RESP EFFT: CPT

## 2020-07-28 NOTE — TELEPHONE ENCOUNTER
Refill on amiodarone (CORDARONE) 200 MG tablet     pantoprazole (PROTONIX) 40 MG tablet     apixaban (ELIQUIS) 5 MG TABS tablet     metoprolol succinate (TOPROL XL) 100 MG extended release tablet     :  [facility] - [place], [place] - [facility] [phone] - F [phone]

## 2020-07-29 RX ORDER — PANTOPRAZOLE SODIUM 40 MG/1
40 TABLET, DELAYED RELEASE ORAL DAILY
Qty: 30 TABLET | Refills: 5 | Status: SHIPPED | OUTPATIENT
Start: 2020-07-29 | End: 2020-12-10 | Stop reason: ALTCHOICE

## 2020-07-31 ENCOUNTER — TELEPHONE (OUTPATIENT)
Dept: PULMONOLOGY | Age: 55
End: 2020-07-31

## 2020-07-31 NOTE — TELEPHONE ENCOUNTER
Germania Young from [facility] called and said that they are not in network with the pts insurance. Patient called with message left for patient to call back to office.

## 2020-08-04 ENCOUNTER — TELEPHONE (OUTPATIENT)
Dept: PULMONOLOGY | Age: 55
End: 2020-08-04

## 2020-08-04 NOTE — TELEPHONE ENCOUNTER
I called and spoke with Nevin Piña and she is going to submit to [employer] to see if they will cover with what we have in the office notes.  She said if it is denied she will need to have peer to peer but will call office back if that happens

## 2020-08-04 NOTE — TELEPHONE ENCOUNTER
[facility] called needs secondary dx for CPAP. Needs added to OV note. 7/15/20    Assessment:       · Snoring  · Observed sleep apnea   · Fatigue   · Persistent A. fib on palpitation post attempted cardioversion and underwent ablation followed by cardiology      Plan:       · HST evaluate for sleep related breathing disorder. · Treatment options were discussed with patient if HST reveals SHARRI, including CPAP therapy, oral appliances, upper airway surgery and hypoglossal nerve stimulation. · APAP min pressure of 8 and max pressure of 16 cmH2O if positive HST   · Discussed with patient the pathophysiology of apnea. · Sleep hygiene  · Avoid sedatives, alcohol and caffeinated drinks at bed time. · No driving motorized vehicles or operating heavy machinery while fatigue, drowsy or sleepy. · Weight loss is also recommended as a long-term intervention.     · Complications of SHARRI if not treated were discussed with patient patient to include systemic hypertension, pulmonary hypertension, cardiovascular morbidities, car accidents and all cause mortality.

## 2020-08-10 NOTE — TELEPHONE ENCOUNTER
Lilly De Leon with [facility] called and said that they are unable to use A-fib as a secondary DX. Lilly De Leon said that since the pt hasn't had a cpap before a PA is not required through their insurance.  is withdrawing without giving a denial. There for a peer to peer can not be completed.  Lilly De Leon can be reached at [phone] ext [phone]

## 2020-08-17 NOTE — TELEPHONE ENCOUNTER
I called and left message for Yvan Perez at [facility] to call back to get clarification. Insomnia is it a secondary dx?

## 2020-08-18 NOTE — TELEPHONE ENCOUNTER
I called and left message for Deanna Rogers at [place] to call back to get clarification. Insomnia is it a secondary dx?

## 2020-08-20 NOTE — TELEPHONE ENCOUNTER
I faxed this note to Amy at [facility]. L/m for her to call back to see if anything else needs to be done

## 2020-08-24 NOTE — TELEPHONE ENCOUNTER
I called and spoke with Pradeep Dave and she states that she received the note and they are going to process the order. No further action needed from our office.

## 2020-09-10 ENCOUNTER — OFFICE VISIT (OUTPATIENT)
Dept: CARDIOLOGY CLINIC | Age: 55
End: 2020-09-10
Payer: COMMERCIAL

## 2020-09-10 VITALS
OXYGEN SATURATION: 97 % | SYSTOLIC BLOOD PRESSURE: 104 MMHG | WEIGHT: 267 LBS | BODY MASS INDEX: 35.39 KG/M2 | HEIGHT: 73 IN | HEART RATE: 55 BPM | DIASTOLIC BLOOD PRESSURE: 66 MMHG

## 2020-09-10 PROCEDURE — 99214 OFFICE O/P EST MOD 30 MIN: CPT | Performed by: INTERNAL MEDICINE

## 2020-09-10 RX ORDER — AMIODARONE HYDROCHLORIDE 100 MG/1
100 TABLET ORAL DAILY
Qty: 90 TABLET | Refills: 3 | Status: CANCELLED | OUTPATIENT
Start: 2020-09-10

## 2020-09-10 RX ORDER — AMIODARONE HYDROCHLORIDE 100 MG/1
100 TABLET ORAL DAILY
Qty: 30 TABLET | Refills: 0 | Status: SHIPPED | OUTPATIENT
Start: 2020-09-10 | End: 2020-12-10 | Stop reason: ALTCHOICE

## 2020-09-10 NOTE — PROGRESS NOTES
[facility], [place]   Electrophysiology Consult Note              Date:  September 10, 2020  Patient name: Forest Napier  YOB: 1965    Reason for Consult: Follow-up and Atrial Fibrillation    Requesting Physician:You Wild MD    HISTORY OF PRESENT ILLNESS: Forest Napier is a 63 y.o. male who presents for evaluation for atrial fibrillation (5/19/2020 on EKG) . He has a PMH of kidney stones. He underwent MARTINEZ and cardioversion for atrial fibrillation on 6/5/2020 with Larry Haddad. Latisha Larvgloria showed an EF of 55%. He is currently taking Toprol XL 50mg daily. On 6/30/20 he stated that he had been fatigued and just has not felt well. He originally noticed irregular heart beats with his watch in May 2020. He stated that even mowing grass he gets fatigued. He denied stents, strokes, blood clots, DM, or HTN. EKG 6/30/20 showed AF with HR 82 BPM.      Interval history since last office visit: On 7/8/20 he underwent a successful RFCA for AF/AFL. He was started on amiodarone 200 mg QD on 7/8/20. EKG today 9/10/20 demonstrates atrial rhythm with 1st degree AVB with RBBB with HR 50 BPM. Today 9/10/20 he reports he is feeling well from a cardiac standpoint. He reports after his procedure he had some severe SOB, fatigue and edema. He reports this has sense dissipated. He reports he was diagnosed with SHARRI and prescribed a CPAP. He reports he is attempting to wear his CPAP nightly. He reports he has increased his activity slowly. He reports he has been walking about 2 miles every other day and instituting some weight training. He reports he is taking his medications as prescribed and is tolerating them well. He denies any abnormal bleeding or bruising. Patient denies current edema, chest pain, sob, palpitations, dizziness or syncope. Past Medical History:   has no past medical history on file. Past Surgical History:   has no past surgical history on file.      Allergies:  Patient has no known allergies. Social History:   reports that he has never smoked. He has never used smokeless tobacco. He reports current alcohol use. He reports that he does not use drugs. Family History: family history is not on file. Home Medications:    Prior to Admission medications    Medication Sig Start Date End Date Taking? Authorizing Provider   apixaban (ELIQUIS) 5 MG TABS tablet Take 1 tablet by mouth 2 times daily 7/29/20  Yes Jesus Hernández MD   amiodarone (CORDARONE) 200 MG tablet Take 1 tablet by mouth daily 7/10/20  Yes KANDICE Collier - CNP   metoprolol succinate (TOPROL XL) 100 MG extended release tablet Take 1 tablet by mouth daily 6/29/20  Yes Jesus Hernández MD   pantoprazole (PROTONIX) 40 MG tablet Take 1 tablet by mouth daily  Patient not taking: Reported on 9/10/2020 7/29/20 12/28/21  Jesus Hernández MD       REVIEW OF SYSTEMS:    All 14-point review of systems are completed and  pertinent positives are mentioned in the history of present illness. Other  systems are reviewed and are negative. Physical Examination:    /66   Pulse 55   Ht 6' 1\" (1.854 m)   Wt 267 lb (121.1 kg)   SpO2 97%   BMI 35.23 kg/m²      Constitutional and General Appearance:    alert, cooperative, no distress and appears stated age  [de-identified]:    PERRLA, no cervical lymphadenopathy. No masses palpable. Normal oral  mucosa  Respiratory:  · Normal excursion and expansion without use of accessory muscles  · Resp Auscultation: Normal breath sounds without dullness or wheezing  Cardiovascular:  · The apical impulse is not displaced  · Regular rate and rhythm. No murmurs, gallops, or regurgitation. Abdomen:  · No masses or tenderness  · Bowel sounds present  Extremities:  ·  No Cyanosis or Clubbing  ·  Lower extremity edema: No  · Skin: Warm and dry  Neurological:  · Alert and oriented.   · Moves all extremities well  · No abnormalities of mood, affect, memory, mentation, or behavior are noted    DATA:    ECG: 9/10/20: atrial rhythm with 1st degree AVB with RBBB with HR 50 BPM     EC20: AF 82 bpm     MARTINEZ:  2020   Summary   Ejection fraction is visually estimated at 55%. There is no evidence of mass or thrombus in the left atrium or appendage. Mild eccentric aortic regurgitation. No PFO. Normal EF 55%. [id number] Score for Atrial Fibrillation Stroke Risk   Risk   Factors  Component Value   C CHF No 0   H HTN No 0   A2 Age >= 76 No,  (59 y.o.) 0   D DM No 0   S2 Prior Stroke/TIA No 0   V Vascular Disease No 0   A Age 74-69 No,  (59 y.o.) 0   Sc Sex male 0    [id number]  Score  0   Score last updated 20 1:82 PM EDT    Click here for a link to the UpToDate guideline \"Atrial Fibrillation: Anticoagulation therapy to prevent embolization    Disclaimer: Risk Score calculation is dependent on accuracy of patient problem list and past encounter diagnosis. IMPRESSION:    1. Persistent atrial Amzfnqvziopd-[id number]-ZXMp Score 0  2020  Patient is a pleasant 56-year-old male who is known to me and presents for new diagnosis of traumatic persistent atrial fibrillation. Patient describes shortness of breath, fatigue, dyspnea on exertion. We discussed anticoagulation (NOAC and warfarin), rate control, rhythm control, AVN + pacemaker, and atrial fibrillation ablation. We reviewed risks and benefits of each approach. We discussed side effects of class IC, class II, class III, and class IV antiarrhythmics. All questions were answered. At this point patient like to consider his options. He seems to be leaning towards ablation versus medical therapy. We gave him information on ablation and medications. He will write all his questions or reach back out to us once he has made a decision as to how he would like to proceed with further management of his atrial fibrillation.   It should be noted that patient does not want to stay on medications long-term and so is leaning towards ablation.  - Continue apixaban. - Continue metoprolol.  - Consider ablation vs medical therapy. Let us know decision.    - Follow up in 6-8 weeks regardless. 9/10/20  Patient underwent atrial fibrillation ablation and typical flutter ablation on 07/08/2020. Postoperatively patient had some shortness of breath and dyspnea on exertion that improved with a week's time. He is slowly increasing his activity. He is tolerating all medications including his apixaban and amiodarone. His heart rates intermittently in the 40s. He is asymptomatic with this. Plan currently is to wean his amiodarone. He will consider whether or not he wants to come off of his apixaban. We will consider weaning his metoprolol when he comes back to see us. We will see him in 3 months. All questions and concerns addressed. - Wean amiodarone to 100 mg daily for 30 days then stop. - Continue apixaban. Consider ILR, continuation, or discontinuation ([id number] score 0). - Will consider weaning metoprolol at next visit. - Follow up in 3 months. Will space out further thereafter. 2.  Palpitations  - Plan as per above. 3.  Fatigue  - Plan as per above. RECOMMENDATIONS:  1. Decrease Amiodarone to 100 mg QD for one month then discontinue medication  2. Continue other medications as prescribed  3. Follow up with me in 3 months     QUALITY MEASURES  1. Tobacco Cessation Counseling: NA  2. Retake of BP if >140/90:   NA  3. Documentation to PCP/referring for new patient:  Sent to PCP at close of office visit  4. CAD patient on anti-platelet: NA  5. CAD patient on STATIN therapy:  NA  6. Patient with CHF and aFib on anticoagulation:  Yes     All questions and concerns were addressed to the patient/family. Alternatives to my treatment were discussed. This note was scribed in the presence of Alisa Coelho MD by Pedro Holt. Jessica Mendiola RN. Dr. Joby Donohue MD  Electrophysiology  [facility] [address]. [address].   [place] [address]  Phone: [phone]  Fax: [phone]     NOTE: This report was transcribed using voice recognition software. Every effort was made to ensure accuracy, however, inadvertent computerized transcription errors may be present. The scribe's documentation has been prepared under my direction and personally reviewed by me in its entirety. I confirm that the note above accurately reflects all work, physical examination, the discussion of treatments and procedures, and medical decision making performed by me. Shantell Villavicencio MD personally performed the services described in this documentation as scribed by nurse in my presence, and is both accurate and complete.     Electronically signed by Lenny Coelho MD on 9/10/2020 at 11:37 AM

## 2020-09-10 NOTE — PATIENT INSTRUCTIONS
RECOMMENDATIONS:  1. Decrease Amiodarone to 100 mg QD for one month then discontinue medication  2. Continue other medications as prescribed  3.  Follow up with me in 3 months

## 2020-09-10 NOTE — LETTER
Allergies:  Patient has no known allergies. Social History:   reports that he has never smoked. He has never used smokeless tobacco. He reports current alcohol use. He reports that he does not use drugs. Family History: family history is not on file. Home Medications:    Prior to Admission medications    Medication Sig Start Date End Date Taking? Authorizing Provider   apixaban (ELIQUIS) 5 MG TABS tablet Take 1 tablet by mouth 2 times daily 7/29/20  Yes Lin Poe MD   amiodarone (CORDARONE) 200 MG tablet Take 1 tablet by mouth daily 7/10/20  Yes KANDICE Tony CNP   metoprolol succinate (TOPROL XL) 100 MG extended release tablet Take 1 tablet by mouth daily 6/29/20  Yes Lin Poe MD   pantoprazole (PROTONIX) 40 MG tablet Take 1 tablet by mouth daily  Patient not taking: Reported on 9/10/2020 7/29/20 12/28/21  Lin Poe MD       REVIEW OF SYSTEMS:    All 14-point review of systems are completed and  pertinent positives are mentioned in the history of present illness. Other  systems are reviewed and are negative. Physical Examination:    /66   Pulse 55   Ht 6' 1\" (1.854 m)   Wt 267 lb (121.1 kg)   SpO2 97%   BMI 35.23 kg/m²      Constitutional and General Appearance:    alert, cooperative, no distress and appears stated age  [de-identified]:    PERRLA, no cervical lymphadenopathy. No masses palpable. Normal oral  mucosa  Respiratory:  · Normal excursion and expansion without use of accessory muscles  · Resp Auscultation: Normal breath sounds without dullness or wheezing  Cardiovascular:  · The apical impulse is not displaced  · Regular rate and rhythm. No murmurs, gallops, or regurgitation. Abdomen:  · No masses or tenderness  · Bowel sounds present  Extremities:  ·  No Cyanosis or Clubbing  ·  Lower extremity edema: No  · Skin: Warm and dry  Neurological:  · Alert and oriented.   · Moves all extremities well · No abnormalities of mood, affect, memory, mentation, or behavior are noted    DATA:    EC/10/20: atrial rhythm with 1st degree AVB with RBBB with HR 50 BPM     EC20: AF 82 bpm     MARTINEZ:  2020   Summary   Ejection fraction is visually estimated at 55%. There is no evidence of mass or thrombus in the left atrium or appendage. Mild eccentric aortic regurgitation. No PFO. Normal EF 55%. [id number] Score for Atrial Fibrillation Stroke Risk   Risk   Factors  Component Value   C CHF No 0   H HTN No 0   A2 Age >= 76 No,  (59 y.o.) 0   D DM No 0   S2 Prior Stroke/TIA No 0   V Vascular Disease No 0   A Age 74-69 No,  (59 y.o.) 0   Sc Sex male 0    [id number]  Score  0   Score last updated 20 8:19 PM EDT    Click here for a link to the UpToDate guideline \"Atrial Fibrillation: Anticoagulation therapy to prevent embolization    Disclaimer: Risk Score calculation is dependent on accuracy of patient problem list and past encounter diagnosis. IMPRESSION:    1. Persistent atrial Ansmjkunyzuw-YRK4WY3-VTIr Score 0  2020  Patient is a pleasant 58-year-old male who is known to me and presents for new diagnosis of traumatic persistent atrial fibrillation. Patient describes shortness of breath, fatigue, dyspnea on exertion. We discussed anticoagulation (NOAC and warfarin), rate control, rhythm control, AVN + pacemaker, and atrial fibrillation ablation. We reviewed risks and benefits of each approach. We discussed side effects of class IC, class II, class III, and class IV antiarrhythmics. All questions were answered. At this point patient like to consider his options. He seems to be leaning towards ablation versus medical therapy. We gave him information on ablation and medications. He will write all his questions or reach back out to us once he has made a decision as to how he would like to proceed with further management of his atrial fibrillation.   It should be noted that patient does not want to stay on medications long-term and so is leaning towards ablation.  - Continue apixaban. - Continue metoprolol.  - Consider ablation vs medical therapy. Let us know decision.    - Follow up in 6-8 weeks regardless. 9/10/20  Patient underwent atrial fibrillation ablation and typical flutter ablation on 07/08/2020. Postoperatively patient had some shortness of breath and dyspnea on exertion that improved with a week's time. He is slowly increasing his activity. He is tolerating all medications including his apixaban and amiodarone. His heart rates intermittently in the 40s. He is asymptomatic with this. Plan currently is to wean his amiodarone. He will consider whether or not he wants to come off of his apixaban. We will consider weaning his metoprolol when he comes back to see us. We will see him in 3 months. All questions and concerns addressed. - Wean amiodarone to 100 mg daily for 30 days then stop. - Continue apixaban. Consider ILR, continuation, or discontinuation ([id number] score 0). - Will consider weaning metoprolol at next visit. - Follow up in 3 months. Will space out further thereafter. 2.  Palpitations  - Plan as per above. 3.  Fatigue  - Plan as per above. RECOMMENDATIONS:  1. Decrease Amiodarone to 100 mg QD for one month then discontinue medication  2. Continue other medications as prescribed  3. Follow up with me in 3 months     QUALITY MEASURES  1. Tobacco Cessation Counseling: NA  2. Retake of BP if >140/90:   NA  3. Documentation to PCP/referring for new patient:  Sent to PCP at close of office visit  4. CAD patient on anti-platelet: NA  5. CAD patient on STATIN therapy:  NA  6. Patient with CHF and aFib on anticoagulation:  Yes     All questions and concerns were addressed to the patient/family. Alternatives to my treatment were discussed. This note was scribed in the presence of Tl Nielsen MD by Yecenia Kahn. Loly Bee RN. Dr. Shelly Medley MD  [facility]  [facility] [address]. [place] [address]  Phone: [phone]  Fax: [phone]     NOTE: This report was transcribed using voice recognition software. Every effort was made to ensure accuracy, however, inadvertent computerized transcription errors may be present. The scribe's documentation has been prepared under my direction and personally reviewed by me in its entirety. I confirm that the note above accurately reflects all work, physical examination, the discussion of treatments and procedures, and medical decision making performed by me. Angelina Barros MD personally performed the services described in this documentation as scribed by nurse in my presence, and is both accurate and complete.     Electronically signed by Tommas Lesches, MD on 9/10/2020 at 11:37 AM

## 2020-09-11 PROCEDURE — 93000 ELECTROCARDIOGRAM COMPLETE: CPT | Performed by: INTERNAL MEDICINE

## 2020-09-15 ENCOUNTER — OFFICE VISIT (OUTPATIENT)
Dept: FAMILY MEDICINE CLINIC | Age: 55
End: 2020-09-15
Payer: COMMERCIAL

## 2020-09-15 VITALS
OXYGEN SATURATION: 98 % | TEMPERATURE: 97.7 F | SYSTOLIC BLOOD PRESSURE: 110 MMHG | HEIGHT: 73 IN | DIASTOLIC BLOOD PRESSURE: 74 MMHG | BODY MASS INDEX: 35.12 KG/M2 | HEART RATE: 52 BPM | WEIGHT: 265 LBS

## 2020-09-15 PROCEDURE — 36415 COLL VENOUS BLD VENIPUNCTURE: CPT | Performed by: FAMILY MEDICINE

## 2020-09-15 PROCEDURE — 99396 PREV VISIT EST AGE 40-64: CPT | Performed by: FAMILY MEDICINE

## 2020-09-15 PROCEDURE — 90471 IMMUNIZATION ADMIN: CPT | Performed by: FAMILY MEDICINE

## 2020-09-15 PROCEDURE — 90715 TDAP VACCINE 7 YRS/> IM: CPT | Performed by: FAMILY MEDICINE

## 2020-09-15 ASSESSMENT — PATIENT HEALTH QUESTIONNAIRE - PHQ9
2. FEELING DOWN, DEPRESSED OR HOPELESS: 0
SUM OF ALL RESPONSES TO PHQ QUESTIONS 1-9: 0
SUM OF ALL RESPONSES TO PHQ QUESTIONS 1-9: 0
SUM OF ALL RESPONSES TO PHQ9 QUESTIONS 1 & 2: 0
1. LITTLE INTEREST OR PLEASURE IN DOING THINGS: 0

## 2020-09-15 NOTE — PROGRESS NOTES
Relationships    Social connections     Talks on phone: Not on file     Gets together: Not on file     Attends J[employer] service: Not on file     Active member of club or organization: Not on file     Attends meetings of clubs or organizations: Not on file     Relationship status: Not on file    Intimate partner violence     Fear of current or ex partner: Not on file     Emotionally abused: Not on file     Physically abused: Not on file     Forced sexual activity: Not on file   Other Topics Concern    Not on file   Social History Narrative    Not on file        History reviewed. No pertinent family history. ADVANCE DIRECTIVE: N, <no information>    Vitals:    09/15/20 1243   BP: 110/74   Pulse: 52   Temp: 97.7 °F (36.5 °C)   TempSrc: Temporal   SpO2: 98%   Weight: 265 lb (120.2 kg)   Height: 6' 1\" (1.854 m)     Estimated body mass index is 34.96 kg/m² as calculated from the following:    Height as of this encounter: 6' 1\" (1.854 m). Weight as of this encounter: 265 lb (120.2 kg). Physical Exam  Constitutional: Patient appears well-developed and well-nourished   Ears, Nose, Mouth & Throat: external inspection of ears and nose show no scars, lesions or masses, pt can hear finger rub bilaterally  Neck: neck is supple. No thyromegaly present   Cardiovascular: Normal rate. No lower ext edema present  Respiratory: Effort normal and breath sounds normal. No respiratory distress. No W/R/R  Gastrointestinal: Soft. There is no tenderness. No hernias  Musculoskeletal: Normal range of motion of extremities, normal gait  Psychiatric: judgment and insight appropriate for age, no agitation  Skin: Skin is warm and dry     No flowsheet data found.     Lab Results   Component Value Date    CHOLFAST 160 05/26/2020    TRIGLYCFAST 61 05/26/2020    HDL 61 05/26/2020    LDLCALC 87 05/26/2020    GLUCOSE 155 07/09/2020    LABA1C 6.0 05/26/2020       The 10-year ASCVD risk score (Pop Tse, et al., 2013) is: 3.1%    Values used to calculate the score:      Age: 63 years      Sex: Male      Is Non- : No      Diabetic: No      Tobacco smoker: No      Systolic Blood Pressure: 857 mmHg      Is BP treated: No      HDL Cholesterol: 61 mg/dL      Total Cholesterol: 160 mg/dL    Immunization History   Administered Date(s) Administered    Tdap (Boostrix, Adacel) 09/15/2020       Health Maintenance   Topic Date Due    Hepatitis C screen  1965    HIV screen  07/12/1979    Shingles Vaccine (1 of 2) 07/12/2014    Colon cancer screen colonoscopy  07/12/2014    Flu vaccine (1) 09/01/2020    A1C test (Diabetic or Prediabetic)  05/26/2021    TSH testing  05/26/2021    Lipid screen  05/26/2025    DTaP/Tdap/Td vaccine (2 - Td) 09/15/2030    Hepatitis A vaccine  Aged Out    Hepatitis B vaccine  Aged Out    Hib vaccine  Aged Out    Meningococcal (ACWY) vaccine  Aged Out    Pneumococcal 0-64 years Vaccine  Aged Out       ASSESSMENT/PLAN:    Screening blood work as below  Colonoscopy up to date 2 years ago, told to repeat in 10  tobacco screening neg  Tdap ordered    Chema Perez was seen today for other and annual exam.    Diagnoses and all orders for this visit:    Healthcare maintenance  -     Tdap (age 6y and older) IM ([address])  -     Hemoglobin A1C  -     Lipid Panel  -     Comprehensive Metabolic Panel  -     CBC  -     TSH with Reflex    Difficulty concentrating  Will wait to see how pt's symptoms respond to cardiology decreasing his Amiodarone and BB, If symptoms worsen or fail to improve the patient will schedule an appointment or contact me     Return in about 6 months (around 3/15/2021). An electronic signature was used to authenticate this note.     --Liang Daniels MD on 9/15/2020 at 1:28 PM

## 2020-09-16 LAB
A/G RATIO: 1.8 (ref 1.1–2.2)
ALBUMIN SERPL-MCNC: 4.8 G/DL (ref 3.4–5)
ALP BLD-CCNC: 95 U/L (ref 40–129)
ALT SERPL-CCNC: 25 U/L (ref 10–40)
ANION GAP SERPL CALCULATED.3IONS-SCNC: 12 MMOL/L (ref 3–16)
AST SERPL-CCNC: 20 U/L (ref 15–37)
BILIRUB SERPL-MCNC: 1.4 MG/DL (ref 0–1)
BUN BLDV-MCNC: 17 MG/DL (ref 7–20)
CALCIUM SERPL-MCNC: 9.8 MG/DL (ref 8.3–10.6)
CHLORIDE BLD-SCNC: 103 MMOL/L (ref 99–110)
CHOLESTEROL, TOTAL: 181 MG/DL (ref 0–199)
CO2: 26 MMOL/L (ref 21–32)
CREAT SERPL-MCNC: 1.1 MG/DL (ref 0.9–1.3)
ESTIMATED AVERAGE GLUCOSE: 128.4 MG/DL
GFR AFRICAN AMERICAN: >60
GFR NON-AFRICAN AMERICAN: >60
GLOBULIN: 2.7 G/DL
GLUCOSE BLD-MCNC: 86 MG/DL (ref 70–99)
HBA1C MFR BLD: 6.1 %
HCT VFR BLD CALC: 46.7 % (ref 40.5–52.5)
HDLC SERPL-MCNC: 61 MG/DL (ref 40–60)
HEMOGLOBIN: 15.2 G/DL (ref 13.5–17.5)
LDL CHOLESTEROL CALCULATED: 106 MG/DL
MCH RBC QN AUTO: 28.5 PG (ref 26–34)
MCHC RBC AUTO-ENTMCNC: 32.6 G/DL (ref 31–36)
MCV RBC AUTO: 87.3 FL (ref 80–100)
PDW BLD-RTO: 14.6 % (ref 12.4–15.4)
PLATELET # BLD: 220 K/UL (ref 135–450)
PMV BLD AUTO: 9.3 FL (ref 5–10.5)
POTASSIUM SERPL-SCNC: 4.5 MMOL/L (ref 3.5–5.1)
RBC # BLD: 5.34 M/UL (ref 4.2–5.9)
SODIUM BLD-SCNC: 141 MMOL/L (ref 136–145)
TOTAL PROTEIN: 7.5 G/DL (ref 6.4–8.2)
TRIGL SERPL-MCNC: 70 MG/DL (ref 0–150)
TSH REFLEX: 1.31 UIU/ML (ref 0.27–4.2)
VLDLC SERPL CALC-MCNC: 14 MG/DL
WBC # BLD: 6.7 K/UL (ref 4–11)

## 2020-10-09 ENCOUNTER — TELEPHONE (OUTPATIENT)
Dept: FAMILY MEDICINE CLINIC | Age: 55
End: 2020-10-09

## 2020-10-09 NOTE — TELEPHONE ENCOUNTER
Patient was seen in the office on 9/15 states for [facility] physical. Did not have a form completed and needs his results faxed   [phone].        Please advise patient

## 2020-11-12 ENCOUNTER — TELEPHONE (OUTPATIENT)
Dept: CARDIOLOGY CLINIC | Age: 55
End: 2020-11-12

## 2020-11-12 RX ORDER — APIXABAN 5 MG/1
TABLET, FILM COATED ORAL
Qty: 60 TABLET | Refills: 5 | Status: SHIPPED | OUTPATIENT
Start: 2020-11-12 | End: 2020-12-10 | Stop reason: SDUPTHER

## 2020-11-12 RX ORDER — METOPROLOL SUCCINATE 50 MG/1
50 TABLET, EXTENDED RELEASE ORAL DAILY
Qty: 90 TABLET | Refills: 1 | Status: SHIPPED | OUTPATIENT
Start: 2020-11-12 | End: 2020-12-10 | Stop reason: SDUPTHER

## 2020-11-12 NOTE — TELEPHONE ENCOUNTER
Pharmacy calling for a refill on eliquis and metoprolol. The patient is asking for the script for metoprolol be changed to 50 mg tablets. He states that is the dose he's been taking, however the script in epic states 100 mg. I do see in AGK's note that the 50 mg dose is what the patient reported. I am pending the script. Eliquis is being filled by [facility].   TY

## 2020-11-17 ENCOUNTER — VIRTUAL VISIT (OUTPATIENT)
Dept: PULMONOLOGY | Age: 55
End: 2020-11-17
Payer: COMMERCIAL

## 2020-11-17 PROBLEM — G47.33 MILD OBSTRUCTIVE SLEEP APNEA: Status: ACTIVE | Noted: 2020-11-17

## 2020-11-17 PROBLEM — E66.9 OBESITY (BMI 30-39.9): Status: ACTIVE | Noted: 2020-11-17

## 2020-11-17 PROCEDURE — 99214 OFFICE O/P EST MOD 30 MIN: CPT | Performed by: NURSE PRACTITIONER

## 2020-11-17 ASSESSMENT — SLEEP AND FATIGUE QUESTIONNAIRES
ESS TOTAL SCORE: 1
HOW LIKELY ARE YOU TO NOD OFF OR FALL ASLEEP WHILE SITTING INACTIVE IN A PUBLIC PLACE: 0
HOW LIKELY ARE YOU TO NOD OFF OR FALL ASLEEP WHILE SITTING AND READING: 0
HOW LIKELY ARE YOU TO NOD OFF OR FALL ASLEEP WHEN YOU ARE A PASSENGER IN A CAR FOR AN HOUR WITHOUT A BREAK: 0
HOW LIKELY ARE YOU TO NOD OFF OR FALL ASLEEP WHILE WATCHING TV: 1
HOW LIKELY ARE YOU TO NOD OFF OR FALL ASLEEP IN A CAR, WHILE STOPPED FOR A FEW MINUTES IN TRAFFIC: 0
HOW LIKELY ARE YOU TO NOD OFF OR FALL ASLEEP WHILE LYING DOWN TO REST IN THE AFTERNOON WHEN CIRCUMSTANCES PERMIT: 0
HOW LIKELY ARE YOU TO NOD OFF OR FALL ASLEEP WHILE SITTING AND TALKING TO SOMEONE: 0
HOW LIKELY ARE YOU TO NOD OFF OR FALL ASLEEP WHILE SITTING QUIETLY AFTER LUNCH WITHOUT ALCOHOL: 0

## 2020-11-17 NOTE — PROGRESS NOTES
Patient ID: Reyna Quezada is a 63 y.o. male who is being seen today for   Chief Complaint   Patient presents with    Sleep Apnea     31-90         HPI:     Reyna Quezada is a 63 y.o. male for televideo appointment via video and audio doxy. me virtual visit for SHARRI follow up. HST was reviewed by me and noted below. HST showed mild SHARRI and patient started auto CPAP after testing. Results were dicussed with patient and multiple good questions were answered. States he does not like CPAP. States mask bothers him. Patient is using CPAP 4-5 hrs/night. Using humidifier. No snoring on CPAP. The pressure is well tolerated. The mask is not comfortable-nasal mask dreamwear. Minimal mask leak. No significant daytime sleepiness. No nodding off when driving. No dry nose or throat. Some fatigue. Bedtime is 1030 pm and rise time is 6 am. Sleep onset is few-15 minutes. Wakes up 1 times at night total, dog wake him. 0-1 nocturia. It takes few- unknown minutes to fall back a sleep. No naps during the day. No headache in am. No weight gain. No caffienated beverages during the day. No alcohol. ESS is 1       Sleep Medicine 11/17/2020 7/15/2020   Sitting and reading 0 0   Watching TV 1 1   Sitting, inactive in a public place (e.g. a theatre or a meeting) 0 0   As a passenger in a car for an hour without a break 0 0   Lying down to rest in the afternoon when circumstances permit 0 0   Sitting and talking to someone 0 0   Sitting quietly after a lunch without alcohol 0 0   In a car, while stopped for a few minutes in traffic 0 0   Total score 1 1       Past Medical History:  Past Medical History:   Diagnosis Date    Mild obstructive sleep apnea 11/17/2020       Past Surgical History:    History reviewed. No pertinent surgical history. Allergies:  has No Known Allergies. Social History:    TOBACCO:   reports that he has never smoked. He has never used smokeless tobacco.  ETOH:   reports current alcohol use.     Family auto CPAP 8-12 cm H2O  -Discussed CPAP acclamation techniques  -Could try different mask for comfort if needed. Patient to call DME if he would like to try different mask.  -Discussed how to adjust humidification settings  -Discussed alternative treatments for SHARRI. Patient prefers to continue with CPAP at this time. - Advised to use CPAP 6-8 hrs at night and during naps- continue to increase use. - Replacement of mask, tubing, head straps every 3-6 months or sooner if damaged. - Patient instructed to contact DME company for any mask, tubing or machine trouble shooting if problems arise.  - Sleep hygiene  -Cognitive behavioral therapy was discussed with patient including stimulus control and sleep restriction  - Avoid sedatives, alcohol and caffeinated drinks at bed time. - Patient counseled to never drive or operate heavy machinery while fatigue, drowsy or sleepy. - Weight loss is recommended as a long-term intervention.    - Complications of SHARRI if not treated were discussed with patient patient, including: systemic hypertension, pulmonary hypertension, cardiovascular morbidities, car accidents and all cause mortality.  -Patient education regarding sleep tips and CPAP cleaning recommendations     Follow up:6 weeks, sooner if needed    Consent for telehealth visit was obtained and is noted in chart      C/ Eras 47. Mey Carter is a 63 y.o. male being evaluated by a Virtual Visit (video visit) encounter to address concerns as mentioned above. A caregiver was present when appropriate. Due to this being a TeleHealth encounter (During JVEQF-83 public health emergency), evaluation of the following organ systems was limited: Vitals/Constitutional/EENT/Resp/CV/GI//MS/Neuro/Skin/Heme-Lymph-Imm.   Pursuant to the emergency declaration under the [address] [facility] [place], [address] waiver authority and the [employer] Supplemental [employer]ropriations Act, this Virtual Visit was conducted with patient's (and/or legal guardian's) consent, to reduce the patient's risk of exposure to COVID-19 and provide necessary medical care. The patient (and/or legal guardian) has also been advised to contact this office for worsening conditions or problems, and seek emergency medical treatment and/or call 911 if deemed necessary. Patient identification was verified at the start of the visit: Yes    Total time spent for this encounter: 32 minutes    Services were provided through a video synchronous discussion virtually to substitute for in-person clinic visit. Patient and provider were located at their individual homes. --KANDICE Phillips - CNP on 11/17/2020 at 1:41 PM    An electronic signature was used to authenticate this note.

## 2020-11-17 NOTE — PATIENT INSTRUCTIONS

## 2020-12-09 NOTE — PROGRESS NOTES
[facility]   Electrophysiology Consult Note              Date:  December 10, 2020  Patient name: Geovanny Hurt  YOB: 1965    Chief Complaint:  3 Month Follow-Up and Atrial Fibrillation    Primary Care Physician:No primary care provider on file. HISTORY OF PRESENT ILLNESS: Geovanny Hurt is a 63 y.o. male who presents for evaluation for atrial fibrillation (5/19/2020 on EKG) . He has a PMH of kidney stones. He underwent MARTINEZ and cardioversion for atrial fibrillation on 6/5/2020 with Ml Mccartney. [facility] showed an EF of 55%. He is currently taking Toprol XL 50mg daily. On 6/30/20 he stated that he had been fatigued and just has not felt well. He originally noticed irregular heart beats with his watch in May 2020. He stated that even mowing grass he gets fatigued. He denied stents, strokes, blood clots, DM, or HTN. EKG 6/30/20 showed AF with HR 82 BPM.   On 7/8/20 he underwent a successful RFCA for AF/AFL. He was started on amiodarone 200 mg QD on 7/8/20. EKG today 9/10/20 demonstrates atrial bradycardia with RBBB with HR 50 BPM. At his office visit on 9/10/20 he reported he was feeling well from a cardiac standpoint. He reported after his procedure he had some severe SOB, fatigue and edema. He reported this had since dissipated. He reported he was diagnosed with SHARRI and prescribed a CPAP. He reported he is attempting to wear his CPAP nightly. He reported he had increased his activity slowly, and was walking about 2 miles every other day and instituting some weight training. During this office visit on 9/10/20 his amiodarone was decreased to 100 mg daily for one month and then stopped. Interval history since last office visit: His EKG today 12/10/20 demonstrates SB with RBBB with HR 57 BPM. Today 12/10/20 he reports he is feeling well since his ablation. He reports he has not had recurrence of his AF based on his apple watch readings.  He reports he is active, walking around 2.5 miles daily, and tolerates that well. He reports he is able to get his heart rate up to 130 BPM with exercise. He reports he is no longer taking amiodarone. He reports he is taking his other medications as prescribed and tolerates them well. He denies any abnormal bleeding or bruising. Patient denies current edema, chest pain, sob, palpitations, dizziness or syncope. Past Medical History:   has a past medical history of Mild obstructive sleep apnea. Past Surgical History:   has no past surgical history on file. Allergies:  Patient has no known allergies. Social History:   reports that he has never smoked. He has never used smokeless tobacco. He reports current alcohol use. He reports that he does not use drugs. Family History: family history is not on file. Home Medications:    Prior to Admission medications    Medication Sig Start Date End Date Taking? Authorizing Provider   ELIQUIS 5 MG TABS tablet TAKE 1 TABLET BY MOUTH TWO TIMES DAILY 11/12/20  Yes Elsie Richardson MD   metoprolol succinate (TOPROL XL) 50 MG extended release tablet Take 1 tablet by mouth daily  Patient taking differently: Take 100 mg by mouth daily  11/12/20  Yes Joseline Gardiner MD       REVIEW OF SYSTEMS:    All 14-point review of systems are completed and  pertinent positives are mentioned in the history of present illness. Other  systems are reviewed and are negative. Physical Examination:    /60   Pulse 57   Ht 6' 1\" (1.854 m)   Wt 264 lb (119.7 kg)   SpO2 96%   BMI 34.83 kg/m²      Constitutional and General Appearance:    alert, cooperative, no distress and appears stated age  [de-identified]:    PERRLA, no cervical lymphadenopathy. No masses palpable. Normal oral  mucosa  Respiratory:  · Normal excursion and expansion without use of accessory muscles  · Resp Auscultation: Normal breath sounds without dullness or wheezing  Cardiovascular:  · The apical impulse is not displaced  · Regular rate and rhythm. No murmurs, gallops, or regurgitation. Abdomen:  · No masses or tenderness  · Bowel sounds present  Extremities:  ·  No Cyanosis or Clubbing  ·  Lower extremity edema: No  · Skin: Warm and dry  Neurological:  · Alert and oriented. · Moves all extremities well  · No abnormalities of mood, affect, memory, mentation, or behavior are noted    DATA:    EC/10/20: SB with RBBB with HR 57 BPM    EC/10/20: atrial bradycardia with RBBB with HR 50 BPM     EC20: AF 82 bpm     MARTINEZ:  20: Summary   Ejection fraction is visually estimated at 55%. There is no evidence of mass or thrombus in the left atrium or appendage. Mild eccentric aortic regurgitation. No PFO. Normal EF 55%. STRESS TEST: 20:   Summary    There is symmetrical isotope uptake at stress and rest.   There is no evidence  of myocardial ischemia or scar. The LVEF is 65% with normal LV wall motion and TID is normal at 0.90. This is a low risk cardiac scan. [id number] Score for Atrial Fibrillation Stroke Risk   Risk   Factors  Component Value   C CHF No 0   H HTN No 0   A2 Age >= 76 No,  (59 y.o.) 0   D DM No 0   S2 Prior Stroke/TIA No 0   V Vascular Disease No 0   A Age 74-69 No,  (59 y.o.) 0   Sc Sex male 0    [id number]  Score  0   Score last updated 20 6:86 PM EDT    Click here for a link to the UpToDate guideline \"Atrial Fibrillation: Anticoagulation therapy to prevent embolization    Disclaimer: Risk Score calculation is dependent on accuracy of patient problem list and past encounter diagnosis. IMPRESSION:    1. Persistent atrial Trdjqevuwinf-YEI1SW7-LMMg Score 0  2020  Patient is a pleasant 58-year-old male who is known to me and presents for new diagnosis of traumatic persistent atrial fibrillation. Patient describes shortness of breath, fatigue, dyspnea on exertion. We discussed anticoagulation (NOAC and warfarin), rate control, rhythm control, AVN + pacemaker, and atrial fibrillation ablation. atrial fibrillation. No changes at this point. Follow-up with us in 1 year. - Continue apixaban. - Continue metoprolol.  - Follow up with me in 1 year. 2.  Palpitations  - Plan as per above. 3.  Fatigue  - Plan as per above. RECOMMENDATIONS:  1. Continue to be active  2. Continue metoprolol and eliquis, refills given as warranted   3. Follow up with me in 1 year     QUALITY MEASURES  1. Tobacco Cessation Counseling: NA  2. Retake of BP if >140/90:   NA  3. Documentation to PCP/referring for new patient:  Sent to PCP at close of office visit  4. CAD patient on anti-platelet: NA  5. CAD patient on STATIN therapy:  NA  6. Patient with CHF and aFib on anticoagulation:  Yes     All questions and concerns were addressed to the patient/family. Alternatives to my treatment were discussed. This note was scribed in the presence of Karina Reinoso MD by Chelsea Malhotra. Dominga Roy RN. Dr. Rodríguez Boudreaux MD  Electrophysiology  [facility]. [address] [place] [address]. [address]. [place] [address]  Phone: [phone]  Fax: [phone]     NOTE: This report was transcribed using voice recognition software. Every effort was made to ensure accuracy, however, inadvertent computerized transcription errors may be present. The scribe's documentation has been prepared under my direction and personally reviewed by me in its entirety. I confirm that the note above accurately reflects all work, physical examination, the discussion of treatments and procedures, and medical decision making performed by me. Dee Castellon MD personally performed the services described in this documentation as scribed by nurse in my presence, and is both accurate and complete.     Electronically signed by Yrn Canchola MD on 12/10/2020 at 11:20 AM

## 2020-12-10 ENCOUNTER — OFFICE VISIT (OUTPATIENT)
Dept: CARDIOLOGY CLINIC | Age: 55
End: 2020-12-10
Payer: COMMERCIAL

## 2020-12-10 VITALS
HEART RATE: 57 BPM | WEIGHT: 264 LBS | OXYGEN SATURATION: 96 % | DIASTOLIC BLOOD PRESSURE: 60 MMHG | HEIGHT: 73 IN | BODY MASS INDEX: 34.99 KG/M2 | SYSTOLIC BLOOD PRESSURE: 110 MMHG

## 2020-12-10 PROCEDURE — 93000 ELECTROCARDIOGRAM COMPLETE: CPT | Performed by: INTERNAL MEDICINE

## 2020-12-10 PROCEDURE — 99214 OFFICE O/P EST MOD 30 MIN: CPT | Performed by: INTERNAL MEDICINE

## 2020-12-10 RX ORDER — METOPROLOL SUCCINATE 50 MG/1
50 TABLET, EXTENDED RELEASE ORAL DAILY
Qty: 90 TABLET | Refills: 3 | Status: SHIPPED | OUTPATIENT
Start: 2020-12-10 | End: 2022-01-27 | Stop reason: SINTOL

## 2020-12-10 NOTE — PATIENT INSTRUCTIONS
RECOMMENDATIONS:  1. Continue to be active  2. Continue metoprolol and eliquis, refills given as warranted   3.  Follow up with me in 1 year

## 2020-12-10 NOTE — LETTER
· Regular rate and rhythm. No murmurs, gallops, or regurgitation. Abdomen:  · No masses or tenderness  · Bowel sounds present  Extremities:  ·  No Cyanosis or Clubbing  ·  Lower extremity edema: No  · Skin: Warm and dry  Neurological:  · Alert and oriented. · Moves all extremities well  · No abnormalities of mood, affect, memory, mentation, or behavior are noted    DATA:    EC/10/20: SB with RBBB with HR 57 BPM    EC/10/20: atrial bradycardia with RBBB with HR 50 BPM     EC20: AF 82 bpm     MARTINEZ:  20: Summary   Ejection fraction is visually estimated at 55%. There is no evidence of mass or thrombus in the left atrium or appendage. Mild eccentric aortic regurgitation. No PFO. Normal EF 55%. STRESS TEST: 20:   Summary    There is symmetrical isotope uptake at stress and rest.   There is no evidence  of myocardial ischemia or scar. The LVEF is 65% with normal LV wall motion and TID is normal at 0.90. This is a low risk cardiac scan. [id number] Score for Atrial Fibrillation Stroke Risk   Risk   Factors  Component Value   C CHF No 0   H HTN No 0   A2 Age >= 76 No,  (59 y.o.) 0   D DM No 0   S2 Prior Stroke/TIA No 0   V Vascular Disease No 0   A Age 74-69 No,  (59 y.o.) 0   Sc Sex male 0    [id number]  Score  0   Score last updated 20 3:65 PM EDT    Click here for a link to the UpToDate guideline \"Atrial Fibrillation: Anticoagulation therapy to prevent embolization    Disclaimer: Risk Score calculation is dependent on accuracy of patient problem list and past encounter diagnosis. IMPRESSION:    1. Persistent atrial Noraxkctmxiq-IGE4PY8-RJSq Score 0  2020  Patient is a pleasant 48-year-old male who is known to me and presents for new diagnosis of traumatic persistent atrial fibrillation. Patient describes shortness of breath, fatigue, dyspnea on exertion. We discussed anticoagulation (NOAC and warfarin), rate control, rhythm control, AVN + pacemaker, and atrial fibrillation ablation. We reviewed risks and benefits of each approach. We discussed side effects of class IC, class II, class III, and class IV antiarrhythmics. All questions were answered. At this point patient like to consider his options. He seems to be leaning towards ablation versus medical therapy. We gave him information on ablation and medications. He will write all his questions or reach back out to us once he has made a decision as to how he would like to proceed with further management of his atrial fibrillation. It should be noted that patient does not want to stay on medications long-term and so is leaning towards ablation.  - Continue apixaban. - Continue metoprolol.  - Consider ablation vs medical therapy. Let us know decision.    - Follow up in 6-8 weeks regardless. 9/10/20  Patient underwent atrial fibrillation ablation and typical flutter ablation on 07/08/2020. Postoperatively patient had some shortness of breath and dyspnea on exertion that improved with a week's time. He is slowly increasing his activity. He is tolerating all medications including his apixaban and amiodarone. His heart rates intermittently in the 40s. He is asymptomatic with this. Plan currently is to wean his amiodarone. He will consider whether or not he wants to come off of his apixaban. We will consider weaning his metoprolol when he comes back to see us. We will see him in 3 months. All questions and concerns addressed. - Wean amiodarone to 100 mg daily for 30 days then stop. - Continue apixaban. Consider ILR, continuation, or discontinuation ([id number] score 0). - Will consider weaning metoprolol at next visit. - Follow up in 3 months. Will space out further thereafter. 12/10/2020  Patient presents for follow-up. He has been doing well. He is tolerating his apixaban without issues.   He is tolerating his metoprolol without any

## 2021-01-05 ENCOUNTER — VIRTUAL VISIT (OUTPATIENT)
Dept: PULMONOLOGY | Age: 56
End: 2021-01-05
Payer: COMMERCIAL

## 2021-01-05 ENCOUNTER — TELEPHONE (OUTPATIENT)
Dept: PULMONOLOGY | Age: 56
End: 2021-01-05

## 2021-01-05 DIAGNOSIS — R53.83 OTHER FATIGUE: ICD-10-CM

## 2021-01-05 DIAGNOSIS — G47.33 MILD OBSTRUCTIVE SLEEP APNEA: Primary | ICD-10-CM

## 2021-01-05 DIAGNOSIS — E66.9 OBESITY (BMI 30-39.9): ICD-10-CM

## 2021-01-05 DIAGNOSIS — Z71.89 CPAP USE COUNSELING: ICD-10-CM

## 2021-01-05 PROCEDURE — 99213 OFFICE O/P EST LOW 20 MIN: CPT | Performed by: NURSE PRACTITIONER

## 2021-01-05 ASSESSMENT — SLEEP AND FATIGUE QUESTIONNAIRES
HOW LIKELY ARE YOU TO NOD OFF OR FALL ASLEEP WHEN YOU ARE A PASSENGER IN A CAR FOR AN HOUR WITHOUT A BREAK: 0
HOW LIKELY ARE YOU TO NOD OFF OR FALL ASLEEP WHILE SITTING AND TALKING TO SOMEONE: 0
HOW LIKELY ARE YOU TO NOD OFF OR FALL ASLEEP WHILE LYING DOWN TO REST IN THE AFTERNOON WHEN CIRCUMSTANCES PERMIT: 1
HOW LIKELY ARE YOU TO NOD OFF OR FALL ASLEEP WHILE WATCHING TV: 1

## 2021-01-05 NOTE — PROGRESS NOTES
Patient ID: Nancy Dozier is a 63 y.o. male who is being seen today for   Chief Complaint   Patient presents with    Sleep Apnea     6 week follow up          HPI:     Nancy Dozier is a 63 y.o. male for televideo appointment via video and audio doxy. me virtual visit for SHARRI follow up. States he is doing okay with CPAP. Patient is using CPAP 6 hrs/night. Using humidifier. No snoring on CPAP. The pressure is well tolerated. The mask is not very comfortable--nasal mask dreamwear. No mask leak. No significant daytime sleepiness. No nodding off when driving. No dry nose or throat. Some fatigue. Bedtime is  pm and rise time is 6-630 am. Sleep onset is 15-20 minutes. Wakes up 0-1 times at night total. 0-1 nocturia. It takes few- unknown minutes to fall back a sleep. Rare naps during the day, . No headache in am. No weight gain. No caffienated beverages during the day. No alcohol. ESS is 3. Previous HPI 11/17/20  Nancy Dozier is a 63 y.o. male for televideo appointment via video and audio doxy. me virtual visit for SHARRI follow up. HST was reviewed by me and noted below. HST showed mild SHARRI and patient started auto CPAP after testing. Results were dicussed with patient and multiple good questions were answered. States he does not like CPAP. States mask bothers him. Patient is using CPAP 4-5 hrs/night. Using humidifier. No snoring on CPAP. The pressure is well tolerated. The mask is not comfortable-nasal mask dreamwear. Minimal mask leak. No significant daytime sleepiness. No nodding off when driving. No dry nose or throat. Some fatigue. Bedtime is 1030 pm and rise time is 6 am. Sleep onset is few-15 minutes. Wakes up 1 times at night total, dog wake him. 0-1 nocturia. It takes few- unknown minutes to fall back a sleep. No naps during the day. No headache in am. No weight gain. No caffienated beverages during the day. No alcohol.  ESS is 1 Sleep Medicine 1/5/2021 11/17/2020 7/15/2020   Sitting and reading 1 0 0   Watching TV 1 1 1   Sitting, inactive in a public place (e.g. a theatre or a meeting) 0 0 0   As a passenger in a car for an hour without a break 0 0 0   Lying down to rest in the afternoon when circumstances permit 1 0 0   Sitting and talking to someone 0 0 0   Sitting quietly after a lunch without alcohol 0 0 0   In a car, while stopped for a few minutes in traffic 0 0 0   Total score 3 1 1       Past Medical History:  Past Medical History:   Diagnosis Date    Mild obstructive sleep apnea 11/17/2020       Past Surgical History:    History reviewed. No pertinent surgical history. Allergies:  has No Known Allergies. Social History:    TOBACCO:   reports that he has never smoked. He has never used smokeless tobacco.  ETOH:   reports current alcohol use. Family History:   History reviewed. No pertinent family history. Current Medications:    Current Outpatient Medications:     apixaban (ELIQUIS) 5 MG TABS tablet, Take 1 tablet by mouth 2 times daily, Disp: 180 tablet, Rfl: 3    metoprolol succinate (TOPROL XL) 50 MG extended release tablet, Take 1 tablet by mouth daily, Disp: 90 tablet, Rfl: 3      Objective:   PHYSICAL EXAM:    There were no vitals taken for this visit. Exam:  Gen: No acute distress, does not appear to be in pain. Appears well developed and nourished. HENT: Head is normocephalic and atraumatic. Normal appearing nose. External Ears normal.   Neck: No visualized mass. Trachea is midline   Eyes: EOM intact. No visible discharge. Resp:No visualized signs of difficulty breathing or respiratory distress, speaking in full sentences. Respiratory effort normal.  Neuro: Awake. Alert. Able to follow commands. No facial asymmetry. Psych: Oriented x 3. No anxiety. Normal affect. DATA:   7/22/2020 HST AHI 11, low SPO2 86%.   Patient started auto CPAP 8-16 cm H2O    CPAP compliance data: Compliance download report from 10/12/20 to 11/10/20 reviewed today by me and showed patient is using machine 4:52 hrs/night with 43% compliance and AHI 0.4 within this time frame. 13/30days with greater than 4 hours of machine use. 90% pressure 9.0 cm H20 on auto CPAP 8-16 cm H2O    Compliance download report from 12/5/20 to 1/3/20 reviewed today by me and showed patient is using machine 6:04 hrs/night with 83% compliance and AHI 0.2 within this time frame. 25/30days with greater than 4 hours of machine use. 90% pressure 9.4 cm H20 on auto CPAP 8-12 cm H2O     Assessment:      · Mild SHARRI. Auto CPAP 8-12 cm H2O. Optimal compliance and efficacy on review today. · Snoring- resolved on CPAP  · Observed sleep apnea- resolved on CPAP  · Fatigue  · A. fibfollowed by cardiology  · Obesity    Plan:       -Continue auto CPAP 8-12 cm H2O  -Discussed CPAP acclamation techniques  -Could try different mask for comfort if needed. Patient to call DME if he would like to try different mask.  -Discussed alternative treatments for SHARRI. Patient prefers to continue with CPAP at this time. - Advised to use CPAP 6-8 hrs at night and during naps- continue to increase use. - Replacement of mask, tubing, head straps every 3-6 months or sooner if damaged. - Patient instructed to contact [employer] company for any mask, tubing or machine trouble shooting if problems arise.  - Sleep hygiene  -Cognitive behavioral therapy was discussed with patient including stimulus control and sleep restriction  - Avoid sedatives, alcohol and caffeinated drinks at bed time. - Patient counseled to never drive or operate heavy machinery while fatigue, drowsy or sleepy. - Weight loss is recommended as a long-term intervention.    - Complications of SHARRI if not treated were discussed with patient patient, including: systemic hypertension, pulmonary hypertension, cardiovascular morbidities, car accidents and all cause mortality. -Patient education regarding sleep tips and CPAP cleaning recommendations     Follow up:1 year, sooner if needed    Consent for telehealth visit was obtained and is noted in chart      C/ Eras 47. Lluvia Kaufman is a 63 y.o. male being evaluated by a Virtual Visit (video visit) encounter to address concerns as mentioned above. A caregiver was present when appropriate. Due to this being a TeleHealth encounter (During [place] [address] [facility] emergency), evaluation of the following organ systems was limited: Vitals/Constitutional/EENT/Resp/CV/GI//MS/Neuro/Skin/Heme-Lymph-Imm. Pursuant to the emergency declaration under the [address] [place] [address], [address] [place] [address] authority and the [employer] Act, this Virtual Visit was conducted with patient's (and/or legal guardian's) consent, to reduce the patient's risk of exposure to COVID-19 and provide necessary medical care. The patient (and/or legal guardian) has also been advised to contact this office for worsening conditions or problems, and seek emergency medical treatment and/or call 911 if deemed necessary. Patient identification was verified at the start of the visit: Yes      Services were provided through a video synchronous discussion virtually to substitute for in-person clinic visit. Patient and provider were located at their individual homes. --KANDICE Ann CNP on 1/5/2021 at 3:08 PM    An electronic signature was used to authenticate this note.

## 2021-01-05 NOTE — PATIENT INSTRUCTIONS

## 2021-09-08 ENCOUNTER — OFFICE VISIT (OUTPATIENT)
Dept: FAMILY MEDICINE CLINIC | Age: 56
End: 2021-09-08
Payer: COMMERCIAL

## 2021-09-08 VITALS
DIASTOLIC BLOOD PRESSURE: 62 MMHG | HEART RATE: 61 BPM | SYSTOLIC BLOOD PRESSURE: 112 MMHG | OXYGEN SATURATION: 97 % | WEIGHT: 258.4 LBS | HEIGHT: 73 IN | BODY MASS INDEX: 34.25 KG/M2

## 2021-09-08 DIAGNOSIS — Z13.220 SCREENING CHOLESTEROL LEVEL: ICD-10-CM

## 2021-09-08 DIAGNOSIS — Z12.5 SCREENING FOR PROSTATE CANCER: ICD-10-CM

## 2021-09-08 DIAGNOSIS — Z76.89 ENCOUNTER TO ESTABLISH CARE: ICD-10-CM

## 2021-09-08 DIAGNOSIS — Z11.59 ENCOUNTER FOR HEPATITIS C SCREENING TEST FOR LOW RISK PATIENT: ICD-10-CM

## 2021-09-08 DIAGNOSIS — Z83.3 FAMILY HISTORY OF DIABETES MELLITUS (DM): ICD-10-CM

## 2021-09-08 DIAGNOSIS — I48.0 PAROXYSMAL ATRIAL FIBRILLATION (HCC): Primary | ICD-10-CM

## 2021-09-08 LAB
HCT VFR BLD CALC: 43.8 % (ref 40.5–52.5)
HEMOGLOBIN: 14.3 G/DL (ref 13.5–17.5)
MCH RBC QN AUTO: 28.5 PG (ref 26–34)
MCHC RBC AUTO-ENTMCNC: 32.7 G/DL (ref 31–36)
MCV RBC AUTO: 87.1 FL (ref 80–100)
PDW BLD-RTO: 15.8 % (ref 12.4–15.4)
PLATELET # BLD: 202 K/UL (ref 135–450)
PMV BLD AUTO: 9.4 FL (ref 5–10.5)
RBC # BLD: 5.03 M/UL (ref 4.2–5.9)
WBC # BLD: 7.9 K/UL (ref 4–11)

## 2021-09-08 PROCEDURE — 36415 COLL VENOUS BLD VENIPUNCTURE: CPT | Performed by: NURSE PRACTITIONER

## 2021-09-08 PROCEDURE — 99213 OFFICE O/P EST LOW 20 MIN: CPT | Performed by: NURSE PRACTITIONER

## 2021-09-08 ASSESSMENT — PATIENT HEALTH QUESTIONNAIRE - PHQ9
2. FEELING DOWN, DEPRESSED OR HOPELESS: 0
1. LITTLE INTEREST OR PLEASURE IN DOING THINGS: 0
SUM OF ALL RESPONSES TO PHQ QUESTIONS 1-9: 0
SUM OF ALL RESPONSES TO PHQ9 QUESTIONS 1 & 2: 0
SUM OF ALL RESPONSES TO PHQ QUESTIONS 1-9: 0
SUM OF ALL RESPONSES TO PHQ QUESTIONS 1-9: 0

## 2021-09-08 NOTE — PROGRESS NOTES
Patient: Alfreda Nascimento is a 61 y.o. 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 He saw cardiologist and in June 2020 had the cardioversion due to the A. fib but he states he was still having episodes. July 8 of 2020 he had an ablation and since then he has been stable. He currently is on Eliquis 5 mg tablet twice daily and Toprol-XL 50 mg daily. He has been stable and he is managed by cardiology. He states he has an appointment in November to see cardiology for his yearly. He states that he has had his colonoscopy within the last 5 years and he is on a 10-year program he states. He states he does not get the flu vaccine till around November   He has no complaints today  He has changed his diet and is walking/running. He was a runner in the past and he states he cannot do this as he did in the past.  I reviewed all of his labs from September 15, 2020  TSH is normal, CBC was normal, CMP essentially normal total bili was elevated at 1.4  Lipid panel total cholesterol was 181, triglycerides 70, HDL slightly elevated at 61, LDL slightly elevated at 106  Hemoglobin A1c was 6.1  Current Outpatient Medications   Medication Sig Dispense Refill    apixaban (ELIQUIS) 5 MG TABS tablet Take 1 tablet by mouth 2 times daily 180 tablet 3    metoprolol succinate (TOPROL XL) 50 MG extended release tablet Take 1 tablet by mouth daily 90 tablet 3     No current facility-administered medications for this visit. Patient's past medical history, surgical history, family history, medications,  and allergies  were all reviewed and updated as appropriate today.       Review of Systems   All other systems reviewed and are negative. Physical Exam  Vitals and nursing note reviewed. Constitutional:       Appearance: Normal appearance. He is well-developed. HENT:      Head: Normocephalic. Right Ear: Tympanic membrane, ear canal and external ear normal.      Left Ear: Tympanic membrane, ear canal and external ear normal.      Nose: Nose normal.      Mouth/Throat:      Mouth: Mucous membranes are moist.      Pharynx: Oropharynx is clear. No oropharyngeal exudate or posterior oropharyngeal erythema. Eyes:      Extraocular Movements: Extraocular movements intact. Conjunctiva/sclera: Conjunctivae normal.      Pupils: Pupils are equal, round, and reactive to light. Neck:      Vascular: No carotid bruit. Cardiovascular:      Rate and Rhythm: Normal rate and regular rhythm. Heart sounds: Normal heart sounds. No murmur heard. Pulmonary:      Effort: Pulmonary effort is normal.      Breath sounds: Normal breath sounds. No wheezing. Abdominal:      General: Bowel sounds are normal.      Palpations: Abdomen is soft. There is no mass. Musculoskeletal:         General: Normal range of motion. Cervical back: Normal range of motion and neck supple. Lymphadenopathy:      Cervical: No cervical adenopathy. Skin:     General: Skin is warm and dry. Neurological:      Mental Status: He is alert and oriented to person, place, and time. Psychiatric:         Mood and Affect: Mood normal.         Behavior: Behavior normal.         Thought Content: Thought content normal.         Judgment: Judgment normal.       Vitals:    09/08/21 0708   BP: 112/62   Pulse: 61   SpO2: 97%       Assessment:  Encounter Diagnoses   Name Primary?     Paroxysmal atrial fibrillation (HCC) Yes    Encounter to establish care     Family history of diabetes mellitus (DM)     Encounter for hepatitis C screening test for low risk patient     Screening cholesterol level     Screening for prostate cancer        Controlled SubstancesMonitoring:  NA    Plan:  1. Paroxysmal atrial fibrillation (HCC)  Stable  Continue beta-blocker as previously ordered by cardiology  Continue Eliquis as previously ordered by cardiology  - CBC  - Comprehensive Metabolic Panel    2. Encounter to establish care  Established care    3. Family history of diabetes mellitus (DM)  Possible problem  - Hemoglobin A1C  Reviewed his last hemoglobin A1c on 9/15/2020 it is elevated at 6.1. Repeating this today    4. Encounter for hepatitis C screening test for low risk patient  - Hepatitis C Antibody    5. Screening cholesterol level  - Lipid Panel    6. Screening for prostate cancer  - PSA, Prostatic Specific Antigen      Amanda Willis, APRN - CNP, FNP    Reviewed treatment plan with patient. Patient verbalized understanding to treatment plan and questions were answered. [address]  [facility], [address]

## 2021-09-09 LAB
A/G RATIO: 1.7 (ref 1.1–2.2)
ALBUMIN SERPL-MCNC: 4.4 G/DL (ref 3.4–5)
ALP BLD-CCNC: 78 U/L (ref 40–129)
ALT SERPL-CCNC: 27 U/L (ref 10–40)
ANION GAP SERPL CALCULATED.3IONS-SCNC: 14 MMOL/L (ref 3–16)
AST SERPL-CCNC: 22 U/L (ref 15–37)
BILIRUB SERPL-MCNC: 1.1 MG/DL (ref 0–1)
BUN BLDV-MCNC: 17 MG/DL (ref 7–20)
CALCIUM SERPL-MCNC: 9.3 MG/DL (ref 8.3–10.6)
CHLORIDE BLD-SCNC: 104 MMOL/L (ref 99–110)
CHOLESTEROL, TOTAL: 163 MG/DL (ref 0–199)
CO2: 24 MMOL/L (ref 21–32)
CREAT SERPL-MCNC: 1 MG/DL (ref 0.9–1.3)
ESTIMATED AVERAGE GLUCOSE: 119.8 MG/DL
GFR AFRICAN AMERICAN: >60
GFR NON-AFRICAN AMERICAN: >60
GLOBULIN: 2.6 G/DL
GLUCOSE BLD-MCNC: 94 MG/DL (ref 70–99)
HBA1C MFR BLD: 5.8 %
HDLC SERPL-MCNC: 62 MG/DL (ref 40–60)
HEPATITIS C ANTIBODY INTERPRETATION: NORMAL
LDL CHOLESTEROL CALCULATED: 89 MG/DL
POTASSIUM SERPL-SCNC: 4.4 MMOL/L (ref 3.5–5.1)
PROSTATE SPECIFIC ANTIGEN: 2.02 NG/ML (ref 0–4)
SODIUM BLD-SCNC: 142 MMOL/L (ref 136–145)
TOTAL PROTEIN: 7 G/DL (ref 6.4–8.2)
TRIGL SERPL-MCNC: 58 MG/DL (ref 0–150)
VLDLC SERPL CALC-MCNC: 12 MG/DL

## 2021-10-04 ENCOUNTER — PATIENT MESSAGE (OUTPATIENT)
Dept: FAMILY MEDICINE CLINIC | Age: 56
End: 2021-10-04

## 2021-10-04 NOTE — TELEPHONE ENCOUNTER
Printed form. Need patient signature, waist circum, physician sign. Pt will be in today to .     Left with MA

## 2021-10-11 ENCOUNTER — TELEPHONE (OUTPATIENT)
Dept: CARDIOLOGY CLINIC | Age: 56
End: 2021-10-11

## 2021-10-11 DIAGNOSIS — I48.91 ATRIAL FIBRILLATION, UNSPECIFIED TYPE (HCC): Primary | ICD-10-CM

## 2021-10-11 NOTE — LETTER
[address] Cardiology - [address] [place] [address] [facility]  Phone: [phone]  Fax: [phone]    Humphrey Rodriguez MD        October 15, 2021      Jose Juantonyia Kami  1965    Patient is under my care for his atrial fibrillation. While he does have atrial fibrillation, he is post ablation and his atrial fibrillation is well under control. Please call with any questions or concerns.      Sincerely,        Humphrey Rodriguez MD

## 2021-10-11 NOTE — TELEPHONE ENCOUNTER
Over the weekend pt's Apple watch showed he was out of arrhythmia. Pt sts he did not feel right, he felt a flutter. HR was jumping up then down. Pt also sts last he was denied coverage by an insurance company due to having A-Fib. Pt wanting to know if AASHISH could write a letter stating his A-Fib is under control. Please advise.

## 2021-10-14 NOTE — TELEPHONE ENCOUNTER
Please send a 2 week monitor to patient. Please generate a letter that I can sign on Tuesday that states that while patient has atrial fibrillation, he is post ablation and his atrial fibrillation is well under control. He will  letter. Thanks.

## 2021-10-15 NOTE — TELEPHONE ENCOUNTER
Monitor ordered. Patient scheduled for 10/18 on lab schedule for monitor placement. Letter generated. AGK will sign and patient will .

## 2021-10-18 ENCOUNTER — TELEPHONE (OUTPATIENT)
Dept: CARDIOLOGY CLINIC | Age: 56
End: 2021-10-18

## 2021-10-18 NOTE — TELEPHONE ENCOUNTER
Monitor placed by [facility]  Length of monitor 14 DAYS  Monitor ordered by [facility]  Serial number [id number]  Kit ID   Activation successful prior to pt leaving office?  Yes

## 2021-11-03 PROCEDURE — 93272 ECG/REVIEW INTERPRET ONLY: CPT | Performed by: INTERNAL MEDICINE

## 2021-11-11 ENCOUNTER — TELEPHONE (OUTPATIENT)
Dept: CARDIOLOGY CLINIC | Age: 56
End: 2021-11-11

## 2021-11-24 DIAGNOSIS — I48.91 ATRIAL FIBRILLATION, UNSPECIFIED TYPE (HCC): ICD-10-CM

## 2022-01-04 ENCOUNTER — VIRTUAL VISIT (OUTPATIENT)
Dept: SLEEP MEDICINE | Age: 57
End: 2022-01-04
Payer: COMMERCIAL

## 2022-01-04 DIAGNOSIS — G47.33 MILD OBSTRUCTIVE SLEEP APNEA: Primary | ICD-10-CM

## 2022-01-04 DIAGNOSIS — I48.0 PAROXYSMAL ATRIAL FIBRILLATION (HCC): ICD-10-CM

## 2022-01-04 DIAGNOSIS — Z71.89 CPAP USE COUNSELING: ICD-10-CM

## 2022-01-04 DIAGNOSIS — E66.9 OBESITY (BMI 30-39.9): ICD-10-CM

## 2022-01-04 PROCEDURE — 99213 OFFICE O/P EST LOW 20 MIN: CPT | Performed by: NURSE PRACTITIONER

## 2022-01-04 ASSESSMENT — SLEEP AND FATIGUE QUESTIONNAIRES
HOW LIKELY ARE YOU TO NOD OFF OR FALL ASLEEP WHILE LYING DOWN TO REST IN THE AFTERNOON WHEN CIRCUMSTANCES PERMIT: 0
HOW LIKELY ARE YOU TO NOD OFF OR FALL ASLEEP WHILE WATCHING TV: 0
HOW LIKELY ARE YOU TO NOD OFF OR FALL ASLEEP WHILE SITTING QUIETLY AFTER LUNCH WITHOUT ALCOHOL: 0
HOW LIKELY ARE YOU TO NOD OFF OR FALL ASLEEP WHILE SITTING AND TALKING TO SOMEONE: 0
HOW LIKELY ARE YOU TO NOD OFF OR FALL ASLEEP WHILE SITTING INACTIVE IN A PUBLIC PLACE: 0
HOW LIKELY ARE YOU TO NOD OFF OR FALL ASLEEP IN A CAR, WHILE STOPPED FOR A FEW MINUTES IN TRAFFIC: 0
HOW LIKELY ARE YOU TO NOD OFF OR FALL ASLEEP WHEN YOU ARE A PASSENGER IN A CAR FOR AN HOUR WITHOUT A BREAK: 0
HOW LIKELY ARE YOU TO NOD OFF OR FALL ASLEEP WHILE SITTING AND READING: 0
ESS TOTAL SCORE: 0

## 2022-01-04 NOTE — PROGRESS NOTES
Patient ID: Ashley Rodriguez is a 61 y.o. male who is being seen today for   Chief Complaint   Patient presents with    Sleep Apnea     1 year sleep         HPI:   Ashley Rodriguez is a 61 y.o. male for televideo appointment via video and audio doxy. me virtual visit for SHARRI follow up. States it has been off and on with CPAP. States he needed supplies also was traveling. States overall it is fine. Patient is using CPAP   6 hrs/night. Using humidifier. No snoring on CPAP. The pressure is well tolerated. The mask is comfortable- nasal mask dreamwear. No mask leak. No significant daytime sleepiness. No nodding off when driving. No dry nose or throat. No fatigue. Bedtime is 10 pm and rise time is 5-530 am. Sleep onset is few minutes. Wakes up 1-2 times at night total. 0-1 nocturia. It takes usually  few minutes to fall back a sleep. No naps during the day. No headache in am. No weight gain. 1 caffienated beverages during the day. No alcohol. ESS is 0      Sleep Medicine 1/4/2022 1/5/2021 11/17/2020 7/15/2020   Sitting and reading 0 1 0 0   Watching TV 0 1 1 1   Sitting, inactive in a public place (e.g. a theatre or a meeting) 0 0 0 0   As a passenger in a car for an hour without a break 0 0 0 0   Lying down to rest in the afternoon when circumstances permit 0 1 0 0   Sitting and talking to someone 0 0 0 0   Sitting quietly after a lunch without alcohol 0 0 0 0   In a car, while stopped for a few minutes in traffic 0 0 0 0   Total score 0 3 1 1       Past Medical History:  Past Medical History:   Diagnosis Date    Mild obstructive sleep apnea 11/17/2020       Past Surgical History:    History reviewed. No pertinent surgical history. Allergies:  has No Known Allergies. Social History:    TOBACCO:   reports that he has never smoked. He has never used smokeless tobacco.  ETOH:   reports current alcohol use. Family History:   History reviewed. No pertinent family history.     Current Medications:    Current Outpatient Medications:     apixaban (ELIQUIS) 5 MG TABS tablet, Take 1 tablet by mouth 2 times daily, Disp: 180 tablet, Rfl: 3    metoprolol succinate (TOPROL XL) 50 MG extended release tablet, Take 1 tablet by mouth daily, Disp: 90 tablet, Rfl: 3      Objective:   PHYSICAL EXAM:    There were no vitals taken for this visit. Exam:  Gen: No acute distress, does not appear to be in pain. Appears well developed and nourished. HENT: Head is normocephalic and atraumatic. Normal appearing nose. External Ears normal.   Neck: No visualized mass. Trachea is midline   Eyes: EOM intact. No visible discharge. Resp:No visualized signs of difficulty breathing or respiratory distress, speaking in full sentences. Respiratory effort normal.  Neuro: Awake. Alert. Able to follow commands. No facial asymmetry. Psych: Oriented x 3. No anxiety. Normal affect. DATA:   7/22/2020 HST AHI 11, low SPO2 86%. Patient started auto CPAP 8-16 cm H2O    CPAP compliance data:  Compliance download report from 10/12/20 to 11/10/20 reviewed today by me and showed patient is using machine 4:52 hrs/night with 43% compliance and AHI 0.4 within this time frame. 13/30days with greater than 4 hours of machine use. 90% pressure 9.0 cm H20 on auto CPAP 8-16 cm H2O    Compliance download report from 12/5/20 to 1/3/20 reviewed today by me and showed patient is using machine 6:04 hrs/night with 83% compliance and AHI 0.2 within this time frame. 25/30days with greater than 4 hours of machine use. 90% pressure 9.4 cm H20 on auto CPAP 8-12 cm H2O     Compliance download report from 12/4/21 to 1/2/22 reviewed today by me and showed patient is using machine 5:41 hrs/night with 47% compliance and AHI 0.4 within this time frame. 14/30days with greater than 4 hours of machine use. 90% pressure 8.9 cm H20 on auto CPAP 8-12 cm H2O    Assessment:      · Mild SHARRI. Auto CPAP 8-12 cm H2O. Sub optimal compliance on review today.     · Snoring- resolved on CPAP  · Observed sleep apnea- resolved on CPAP  · Fatigue  · A. fibfollowed by cardiology  · Obesity    Plan:       -Continue auto CPAP 8-12 cm H2O  -Discussed CPAP acclamation techniques  -Discussed alternative treatments for SHARRI. Patient prefers to continue with CPAP at this time. - Advised to use CPAP 6-8 hrs at night and during naps- continue to increase use. - Replacement of mask, tubing, head straps every 3-6 months or sooner if damaged. - Patient instructed to contact [employer] company for any mask, tubing or machine trouble shooting if problems arise.  - Sleep hygiene  -Cognitive behavioral therapy was discussed with patient including stimulus control and sleep restriction  - Avoid sedatives, alcohol and caffeinated drinks at bed time. - Patient counseled to never drive or operate heavy machinery while fatigue, drowsy or sleepy. - Weight loss is recommended as a long-term intervention.    - Complications of SHARRI if not treated were discussed with patient patient, including: systemic hypertension, pulmonary hypertension, cardiovascular morbidities, car accidents and all cause mortality.  -Patient education regarding sleep tips and CPAP cleaning recommendations     Follow up:1 year, sooner if needed    Consent for telehealth visit was obtained and is noted in chart      [address] Jose Manuel Hayward is a 61 y.o. male being evaluated by a Virtual Visit (video visit) encounter to address concerns as mentioned above. A caregiver was present when appropriate. Due to this being a TeleHealth encounter (During [place] [address] public health emergency), evaluation of the following organ systems was limited: Vitals/Constitutional/EENT/Resp/CV/GI//MS/Neuro/Skin/Heme-Lymph-Imm.   Pursuant to the emergency declaration under the [facility] [place], [facility] authority and the [employer], this Virtual Visit was conducted with patient's (and/or legal guardian's) consent, to reduce the patient's risk of exposure to COVID-19 and provide necessary medical care. The patient (and/or legal guardian) has also been advised to contact this office for worsening conditions or problems, and seek emergency medical treatment and/or call 911 if deemed necessary. Patient identification was verified at the start of the visit: Yes      Services were provided through a video synchronous discussion virtually to substitute for in-person clinic visit. Patient and provider were located at their individual homes. --KANDICE Cottrell CNP on 1/4/2022 at 9:01 AM    An electronic signature was used to authenticate this note.

## 2022-01-04 NOTE — PATIENT INSTRUCTIONS

## 2022-01-26 PROCEDURE — 93272 ECG/REVIEW INTERPRET ONLY: CPT | Performed by: INTERNAL MEDICINE

## 2022-01-27 ENCOUNTER — TELEPHONE (OUTPATIENT)
Dept: CARDIOLOGY CLINIC | Age: 57
End: 2022-01-27

## 2022-01-27 ENCOUNTER — OFFICE VISIT (OUTPATIENT)
Dept: CARDIOLOGY CLINIC | Age: 57
End: 2022-01-27
Payer: COMMERCIAL

## 2022-01-27 VITALS
BODY MASS INDEX: 33.61 KG/M2 | WEIGHT: 253.6 LBS | HEIGHT: 73 IN | OXYGEN SATURATION: 97 % | HEART RATE: 68 BPM | SYSTOLIC BLOOD PRESSURE: 120 MMHG | DIASTOLIC BLOOD PRESSURE: 62 MMHG

## 2022-01-27 DIAGNOSIS — I48.0 PAROXYSMAL ATRIAL FIBRILLATION (HCC): Primary | ICD-10-CM

## 2022-01-27 DIAGNOSIS — I45.10 RBBB: ICD-10-CM

## 2022-01-27 DIAGNOSIS — I48.0 PAROXYSMAL ATRIAL FIBRILLATION (HCC): ICD-10-CM

## 2022-01-27 PROCEDURE — 99214 OFFICE O/P EST MOD 30 MIN: CPT | Performed by: INTERNAL MEDICINE

## 2022-01-27 PROCEDURE — 93000 ELECTROCARDIOGRAM COMPLETE: CPT | Performed by: INTERNAL MEDICINE

## 2022-01-27 NOTE — PROGRESS NOTES
[address]   Electrophysiology Consult Note            Date:  January 27, 2022  Patient name: Rowena Portillo  YOB: 1965    Chief Complaint:  No chief complaint on file. Primary Care Physician: KANDICE Chaney CNP    HISTORY OF PRESENT ILLNESS: Rowena Portillo is a 61 y.o. male who presents for evaluation for atrial fibrillation (5/19/2020 on EKG) . He has a PMH of kidney stones. He underwent MARTINEZ and cardioversion for atrial fibrillation on 6/5/2020 with Shravan Gauze. Rozena Mercado showed an EF of 55%. He is currently taking Toprol XL 50mg daily. On 6/30/20 he stated that he had been fatigued and just has not felt well. He originally noticed irregular heart beats with his watch in May 2020. He stated that even mowing grass he gets fatigued. He denied stents, strokes, blood clots, DM, or HTN. EKG 6/30/20 showed AF with HR 82 BPM.   On 7/8/20 he underwent a successful RFCA for AF/AFL. He was started on amiodarone 200 mg QD on 7/8/20. EKG today 9/10/20 demonstrates atrial bradycardia with RBBB with HR 50 BPM. At his office visit on 9/10/20 he reported he was feeling well from a cardiac standpoint. He reported after his procedure he had some severe SOB, fatigue and edema. He reported this had since dissipated. He reported he was diagnosed with SHARRI and prescribed a CPAP. He reported he is attempting to wear his CPAP nightly. He reported he had increased his activity slowly, and was walking about 2 miles every other day and instituting some weight training. During this office visit on 9/10/20 his amiodarone was decreased to 100 mg daily for one month and then stopped. His EKG 12/10/20 demonstrates SB with RBBB with HR 57 BPM. On 12/10/20 he reported he is feeling well since his ablation. He reported he has not had recurrence of his AF based on his apple watch readings. He reports he is active, walking around 2.5 miles daily, and tolerates that well.  He reports he is able to get his heart rate up to 130 BPM with exercise. He reports he is no longer taking amiodarone. Interval History since last office visit: Patient wore a cardiac event monitor from 10/18/2021 to 10/28/2021 which demonstrated predominately NSR with an average HR of 63. Today, 1/27/2022, ECG demonstrates SR (62). He states that he is doing well. He states that he is doing well and able to exercise at the gym and tolerates them well. He has been focusing on his diet and exercise. He is taking his medications as prescribed. Denies recent issues with bleeding or bruising. Patient denies current edema, chest pain, sob, palpitations, dizziness or syncope. Past Medical History:   has a past medical history of Mild obstructive sleep apnea. Past Surgical History:   has no past surgical history on file. Allergies:  Patient has no known allergies. Social History:   reports that he has never smoked. He has never used smokeless tobacco. He reports current alcohol use. He reports that he does not use drugs. Family History: family history is not on file. Home Medications:    Prior to Admission medications    Medication Sig Start Date End Date Taking? Authorizing Provider   apixaban (ELIQUIS) 5 MG TABS tablet Take 1 tablet by mouth 2 times daily 12/10/20   Valeria Burns MD   metoprolol succinate (TOPROL XL) 50 MG extended release tablet Take 1 tablet by mouth daily 12/10/20   BASILIO Villafana MD       REVIEW OF SYSTEMS:    All 14-point review of systems are completed and  pertinent positives are mentioned in the history of present illness. Other  systems are reviewed and are negative. Physical Examination:    There were no vitals taken for this visit. Constitutional and General Appearance:    alert, cooperative, no distress and appears stated age  [de-identified]:    PERRLA, no cervical lymphadenopathy. No masses palpable.  Normal oral  mucosa  Respiratory:  · Normal excursion and expansion without use of accessory muscles  · Resp Auscultation: Normal breath sounds without dullness or wheezing  Cardiovascular:  · The apical impulse is not displaced  · Regular rate and rhythm. No murmurs, gallops, or regurgitation. Abdomen:  · No masses or tenderness  · Bowel sounds present  Extremities:  ·  No Cyanosis or Clubbing  ·  Lower extremity edema: No  · Skin: Warm and dry  Neurological:  · Alert and oriented. · Moves all extremities well  · No abnormalities of mood, affect, memory, mentation, or behavior are noted    DATA:    ECG 1/27/22: Personally reviewed. MARTINEZ:  6/5/20: Summary   Ejection fraction is visually estimated at 55%. There is no evidence of mass or thrombus in the left atrium or appendage. Mild eccentric aortic regurgitation. No PFO. Normal EF 55%. STRESS TEST: 6/2/20:   Summary    There is symmetrical isotope uptake at stress and rest.   There is no evidence  of myocardial ischemia or scar. The LVEF is 65% with normal LV wall motion and TID is normal at 0.90. This is a low risk cardiac scan. [id number] Score for Atrial Fibrillation Stroke Risk   Risk   Factors  Component Value   C CHF No 0   H HTN No 0   A2 Age >= 76 No,  (57 y.o.) 0   D DM No 0   S2 Prior Stroke/TIA No 0   V Vascular Disease No 0   A Age 74-69 No,  (57 y.o.) 0   Sc Sex male 0    [id number]  Score  0   Score last updated 6/26/20 8:22 PM EDT    Click here for a link to the UpToDate guideline \"Atrial Fibrillation: Anticoagulation therapy to prevent embolization    Disclaimer: Risk Score calculation is dependent on accuracy of patient problem list and past encounter diagnosis. IMPRESSION:    1. Persistent atrial [id number]-[id number] Score 0  06/30/2020  Patient is a pleasant 48-year-old male who is known to me and presents for new diagnosis of traumatic persistent atrial fibrillation. Patient describes shortness of breath, fatigue, dyspnea on exertion. We discussed anticoagulation (NOAC and warfarin), rate control, rhythm control, AVN + pacemaker, and atrial fibrillation ablation. We reviewed risks and benefits of each approach. We discussed side effects of class IC, class II, class III, and class IV antiarrhythmics. All questions were answered. At this point patient like to consider his options. He seems to be leaning towards ablation versus medical therapy. We gave him information on ablation and medications. He will write all his questions or reach back out to us once he has made a decision as to how he would like to proceed with further management of his atrial fibrillation. It should be noted that patient does not want to stay on medications long-term and so is leaning towards ablation.  - Continue apixaban. - Continue metoprolol.  - Consider ablation vs medical therapy. Let us know decision.    - Follow up in 6-8 weeks regardless. 9/10/20  Patient underwent atrial fibrillation ablation and typical flutter ablation on 07/08/2020. Postoperatively patient had some shortness of breath and dyspnea on exertion that improved with a week's time. He is slowly increasing his activity. He is tolerating all medications including his apixaban and amiodarone. His heart rates intermittently in the 40s. He is asymptomatic with this. Plan currently is to wean his amiodarone. He will consider whether or not he wants to come off of his apixaban. We will consider weaning his metoprolol when he comes back to see us. We will see him in 3 months. All questions and concerns addressed. - Wean amiodarone to 100 mg daily for 30 days then stop. - Continue apixaban. Consider ILR, continuation, or discontinuation ([id number] score 0). - Will consider weaning metoprolol at next visit. - Follow up in 3 months. Will space out further thereafter. 12/10/2020  Patient presents for follow-up. He has been doing well. He is tolerating his apixaban without issues.   He is tolerating his metoprolol without any issues. He is exercising on a more. He feels much better than he did when he is in atrial fibrillation. No changes at this point. Follow-up with us in 1 year. - Continue apixaban. - Continue metoprolol.  - Follow up with me in 1 year. 1/27/2022  Patient presents for follow-up. He has been doing well outside of decreased exercise tolerance. We discussed oral anticoagulation again and out of an abundance of caution patient to continue on apixaban. Discussed rivaroxaban warfarin as well. Given his decreased exercise tolerance we will stop his metoprolol and see if this improves his symptoms. If any recurrence of his atrial fibrillation will consider diltiazem instead of metoprolol. He has an apple watch and can check his electrograms from is not detected any atrial fibrillation. We will have him follow-up with us in 1 year or as needed. All questions were addressed. - Continue apixaban 5 mg BID. - Stop metoprolol.  - Follow up with me in 1 year or PRN. 2.  Palpitations  - Plan as per above. 3.  Fatigue  - Plan as per above. RECOMMENDATIONS:  1. Try to stop metoprolol to see if this helps with your energy and activity level. Let us know how you feel off of this medication after about 1-2 weeks. 2. Continue to use your watch for monitor for AF recurrences. 3. Continue blood thinner to prevent stroke. 4. Follow up in one year, or sooner if needed. QUALITY MEASURES  1. Tobacco Cessation Counseling: NA  2. Retake of BP if >140/90:   NA  3. Documentation to PCP/referring for new patient:  Sent to PCP at close of office visit  4. CAD patient on anti-platelet: NA  5. CAD patient on STATIN therapy:  NA  6. Patient with CHF and aFib on anticoagulation:  Yes     All questions and concerns were addressed to the patient/family. Alternatives to my treatment were discussed. Pushpa Owen RN, am scribing for and in the presence of Dr. Seble Flor.  01/27/22 9:09 AM   Hillary Thornton RN    The scribe's documentation has been prepared under my direction and personally reviewed by me in its entirety. I confirm that the note above accurately reflects all work, physical examination, the discussion of treatments and procedures, and medical decision making performed by me. López Rodgers MD personally performed the services described in this documentation as scribed by nurse in my presence, and is both accurate and complete. Electronically signed by Juan Jose Agee MD on 1/27/2022 at 10:28 AM     Dr. Evi Veloz MD  [place] [address]. [address] [place] [address]. [address]. [facility] [address]  Phone: [phone]  Fax: [phone]     NOTE: This report was transcribed using voice recognition software. Every effort was made to ensure accuracy, however, inadvertent computerized transcription errors may be present.

## 2022-01-27 NOTE — LETTER
Santana Mccurdy  1965    [facility]   Electrophysiology Consult Note            Date:  January 27, 2022  Patient name: Santana Mccurdy  YOB: 1965    Chief Complaint:  No chief complaint on file. Primary Care Physician: KANDICE Pierce CNP    HISTORY OF PRESENT ILLNESS: Santana Mccurdy is a 61 y.o. male who presents for evaluation for atrial fibrillation (5/19/2020 on EKG) . He has a PMH of kidney stones. He underwent MARTINEZ and cardioversion for atrial fibrillation on 6/5/2020 with Maria E Can. Prisca Rodriguez showed an EF of 55%. He is currently taking Toprol XL 50mg daily. On 6/30/20 he stated that he had been fatigued and just has not felt well. He originally noticed irregular heart beats with his watch in May 2020. He stated that even mowing grass he gets fatigued. He denied stents, strokes, blood clots, DM, or HTN. EKG 6/30/20 showed AF with HR 82 BPM.   On 7/8/20 he underwent a successful RFCA for AF/AFL. He was started on amiodarone 200 mg QD on 7/8/20. EKG today 9/10/20 demonstrates atrial bradycardia with RBBB with HR 50 BPM. At his office visit on 9/10/20 he reported he was feeling well from a cardiac standpoint. He reported after his procedure he had some severe SOB, fatigue and edema. He reported this had since dissipated. He reported he was diagnosed with SHARRI and prescribed a CPAP. He reported he is attempting to wear his CPAP nightly. He reported he had increased his activity slowly, and was walking about 2 miles every other day and instituting some weight training. During this office visit on 9/10/20 his amiodarone was decreased to 100 mg daily for one month and then stopped. His EKG 12/10/20 demonstrates SB with RBBB with HR 57 BPM. On 12/10/20 he reported he is feeling well since his ablation. He reported he has not had recurrence of his AF based on his apple watch readings.  He reports he is active, walking around 2.5 miles daily, and tolerates that well. He reports he is able to get his heart rate up to 130 BPM with exercise. He reports he is no longer taking amiodarone. Interval History since last office visit: Patient wore a cardiac event monitor from 10/18/2021 to 10/28/2021 which demonstrated predominately NSR with an average HR of 63. Today, 1/27/2022, ECG demonstrates SR (62). He states that he is doing well. He states that he is doing well and able to exercise at the gym and tolerates them well. He has been focusing on his diet and exercise. He is taking his medications as prescribed. Denies recent issues with bleeding or bruising. Patient denies current edema, chest pain, sob, palpitations, dizziness or syncope. Past Medical History:   has a past medical history of Mild obstructive sleep apnea. Past Surgical History:   has no past surgical history on file. Allergies:  Patient has no known allergies. Social History:   reports that he has never smoked. He has never used smokeless tobacco. He reports current alcohol use. He reports that he does not use drugs. Family History: family history is not on file. Home Medications:    Prior to Admission medications    Medication Sig Start Date End Date Taking? Authorizing Provider   apixaban (ELIQUIS) 5 MG TABS tablet Take 1 tablet by mouth 2 times daily 12/10/20   Desiree Araujo MD   metoprolol succinate (TOPROL XL) 50 MG extended release tablet Take 1 tablet by mouth daily 12/10/20   BASILIO Velez MD       REVIEW OF SYSTEMS:    All 14-point review of systems are completed and  pertinent positives are mentioned in the history of present illness. Other  systems are reviewed and are negative. Physical Examination:    There were no vitals taken for this visit. Constitutional and General Appearance:    alert, cooperative, no distress and appears stated age  [de-identified]:    PERRLA, no cervical lymphadenopathy. No masses palpable.  Normal oral  mucosa  Respiratory:  · Normal excursion and expansion without use of accessory muscles  · Resp Auscultation: Normal breath sounds without dullness or wheezing  Cardiovascular:  · The apical impulse is not displaced  · Regular rate and rhythm. No murmurs, gallops, or regurgitation. Abdomen:  · No masses or tenderness  · Bowel sounds present  Extremities:  ·  No Cyanosis or Clubbing  ·  Lower extremity edema: No  · Skin: Warm and dry  Neurological:  · Alert and oriented. · Moves all extremities well  · No abnormalities of mood, affect, memory, mentation, or behavior are noted    DATA:    ECG 1/27/22: Personally reviewed. MARTINEZ:  6/5/20: Summary   Ejection fraction is visually estimated at 55%. There is no evidence of mass or thrombus in the left atrium or appendage. Mild eccentric aortic regurgitation. No PFO. Normal EF 55%. STRESS TEST: 6/2/20:   Summary    There is symmetrical isotope uptake at stress and rest.   There is no evidence  of myocardial ischemia or scar. The LVEF is 65% with normal LV wall motion and TID is normal at 0.90. This is a low risk cardiac scan. [id number] Score for Atrial Fibrillation Stroke Risk   Risk   Factors  Component Value   C CHF No 0   H HTN No 0   A2 Age >= 76 No,  (57 y.o.) 0   D DM No 0   S2 Prior Stroke/TIA No 0   V Vascular Disease No 0   A Age 74-69 No,  (57 y.o.) 0   Sc Sex male 0    [id number]  Score  0   Score last updated 6/26/20 3:06 PM EDT    Click here for a link to the UpToDate guideline \"Atrial Fibrillation: Anticoagulation therapy to prevent embolization    Disclaimer: Risk Score calculation is dependent on accuracy of patient problem list and past encounter diagnosis. IMPRESSION:    1. Persistent atrial Ucmzheigobnb-[id number] Score 0  06/30/2020  Patient is a pleasant 76-year-old male who is known to me and presents for new diagnosis of traumatic persistent atrial fibrillation. Patient describes shortness of breath, fatigue, dyspnea on exertion. We discussed anticoagulation (NOAC and warfarin), rate control, rhythm control, AVN + pacemaker, and atrial fibrillation ablation. We reviewed risks and benefits of each approach. We discussed side effects of class IC, class II, class III, and class IV antiarrhythmics. All questions were answered. At this point patient like to consider his options. He seems to be leaning towards ablation versus medical therapy. We gave him information on ablation and medications. He will write all his questions or reach back out to us once he has made a decision as to how he would like to proceed with further management of his atrial fibrillation. It should be noted that patient does not want to stay on medications long-term and so is leaning towards ablation.  - Continue apixaban. - Continue metoprolol.  - Consider ablation vs medical therapy. Let us know decision.    - Follow up in 6-8 weeks regardless. 9/10/20  Patient underwent atrial fibrillation ablation and typical flutter ablation on 07/08/2020. Postoperatively patient had some shortness of breath and dyspnea on exertion that improved with a week's time. He is slowly increasing his activity. He is tolerating all medications including his apixaban and amiodarone. His heart rates intermittently in the 40s. He is asymptomatic with this. Plan currently is to wean his amiodarone. He will consider whether or not he wants to come off of his apixaban. We will consider weaning his metoprolol when he comes back to see us. We will see him in 3 months. All questions and concerns addressed. - Wean amiodarone to 100 mg daily for 30 days then stop. - Continue apixaban. Consider ILR, continuation, or discontinuation ([id number] score 0). - Will consider weaning metoprolol at next visit. - Follow up in 3 months. Will space out further thereafter. 12/10/2020  Patient presents for follow-up. He has been doing well. He is tolerating his apixaban without issues.   He is tolerating his metoprolol without any issues. He is exercising on a more. He feels much better than he did when he is in atrial fibrillation. No changes at this point. Follow-up with us in 1 year. - Continue apixaban. - Continue metoprolol.  - Follow up with me in 1 year. 1/27/2022  Patient presents for follow-up. He has been doing well outside of decreased exercise tolerance. We discussed oral anticoagulation again and out of an abundance of caution patient to continue on apixaban. Discussed rivaroxaban warfarin as well. Given his decreased exercise tolerance we will stop his metoprolol and see if this improves his symptoms. If any recurrence of his atrial fibrillation will consider diltiazem instead of metoprolol. He has an apple watch and can check his electrograms from is not detected any atrial fibrillation. We will have him follow-up with us in 1 year or as needed. All questions were addressed. - Continue apixaban 5 mg BID. - Stop metoprolol.  - Follow up with me in 1 year or PRN. 2.  Palpitations  - Plan as per above. 3.  Fatigue  - Plan as per above. RECOMMENDATIONS:  1. Try to stop metoprolol to see if this helps with your energy and activity level. Let us know how you feel off of this medication after about 1-2 weeks. 2. Continue to use your watch for monitor for AF recurrences. 3. Continue blood thinner to prevent stroke. 4. Follow up in one year, or sooner if needed. QUALITY MEASURES  1. Tobacco Cessation Counseling: NA  2. Retake of BP if >140/90:   NA  3. Documentation to PCP/referring for new patient:  Sent to PCP at close of office visit  4. CAD patient on anti-platelet: NA  5. CAD patient on STATIN therapy:  NA  6. Patient with CHF and aFib on anticoagulation:  Yes     All questions and concerns were addressed to the patient/family. Alternatives to my treatment were discussed. Anayeli Maradiaga RN, am scribing for and in the presence of Dr. Benita Hall. 01/27/22 9:09 AM   Isak Moody RN    The scribe's documentation has been prepared under my direction and personally reviewed by me in its entirety. I confirm that the note above accurately reflects all work, physical examination, the discussion of treatments and procedures, and medical decision making performed by me. Lucille Jain MD personally performed the services described in this documentation as scribed by nurse in my presence, and is both accurate and complete. Electronically signed by Edie Bradshaw MD on 1/27/2022 at 10:28 AM     Dr. Arsenio Sutherland MD  [facility]. [facility]. [address]. [place], [address]  Phone: [phone]  Fax: [phone]     NOTE: This report was transcribed using voice recognition software. Every effort was made to ensure accuracy, however, inadvertent computerized transcription errors may be present.

## 2022-01-27 NOTE — PATIENT INSTRUCTIONS
RECOMMENDATIONS:  1. Try to stop metoprolol to see if this helps with your energy and activity level. Let us know how you feel off of this medication after about 1-2 weeks. 2. Continue to use your watch for monitor for AF recurrences. 3. Continue blood thinner to prevent stroke. 4. Follow up in one year, or sooner if needed.

## 2022-03-19 PROBLEM — R31.29 MICROSCOPIC HEMATURIA: Status: ACTIVE | Noted: 2017-12-12

## 2022-03-19 PROBLEM — N52.9 ERECTILE DYSFUNCTION: Status: ACTIVE | Noted: 2017-12-12

## 2022-03-20 PROBLEM — N28.1 RENAL CYST, LEFT: Status: ACTIVE | Noted: 2017-12-12

## 2022-08-09 ENCOUNTER — TELEMEDICINE (OUTPATIENT)
Dept: PRIMARY CARE CLINIC | Age: 57
End: 2022-08-09
Payer: COMMERCIAL

## 2022-08-09 DIAGNOSIS — Z87.438: ICD-10-CM

## 2022-08-09 DIAGNOSIS — Z00.00 HEALTHCARE MAINTENANCE: ICD-10-CM

## 2022-08-09 DIAGNOSIS — Z87.442 HX OF RENAL CALCULI: ICD-10-CM

## 2022-08-09 DIAGNOSIS — R73.03 PREDIABETES: ICD-10-CM

## 2022-08-09 DIAGNOSIS — G47.33 MILD OBSTRUCTIVE SLEEP APNEA: ICD-10-CM

## 2022-08-09 DIAGNOSIS — E66.9 OBESITY (BMI 30-39.9): ICD-10-CM

## 2022-08-09 DIAGNOSIS — I48.0 PAROXYSMAL ATRIAL FIBRILLATION (HCC): Primary | ICD-10-CM

## 2022-08-09 PROCEDURE — 99204 OFFICE O/P NEW MOD 45 MIN: CPT | Performed by: STUDENT IN AN ORGANIZED HEALTH CARE EDUCATION/TRAINING PROGRAM

## 2022-08-09 SDOH — HEALTH STABILITY: PHYSICAL HEALTH: ON AVERAGE, HOW MANY MINUTES DO YOU ENGAGE IN EXERCISE AT THIS LEVEL?: 40 MIN

## 2022-08-09 SDOH — HEALTH STABILITY: PHYSICAL HEALTH: ON AVERAGE, HOW MANY DAYS PER WEEK DO YOU ENGAGE IN MODERATE TO STRENUOUS EXERCISE (LIKE A BRISK WALK)?: 3 DAYS

## 2022-08-09 ASSESSMENT — PATIENT HEALTH QUESTIONNAIRE - PHQ9
SUM OF ALL RESPONSES TO PHQ9 QUESTIONS 1 & 2: 0
2. FEELING DOWN, DEPRESSED OR HOPELESS: 0
SUM OF ALL RESPONSES TO PHQ QUESTIONS 1-9: 0
SUM OF ALL RESPONSES TO PHQ QUESTIONS 1-9: 0
1. LITTLE INTEREST OR PLEASURE IN DOING THINGS: 0
SUM OF ALL RESPONSES TO PHQ QUESTIONS 1-9: 0
SUM OF ALL RESPONSES TO PHQ QUESTIONS 1-9: 0

## 2022-08-09 ASSESSMENT — ENCOUNTER SYMPTOMS
ABDOMINAL PAIN: 0
EYES NEGATIVE: 1
COUGH: 0
VOMITING: 0
BACK PAIN: 0
NAUSEA: 0
CONSTIPATION: 0
SHORTNESS OF BREATH: 0
CHEST TIGHTNESS: 0
DIARRHEA: 0
WHEEZING: 0

## 2022-08-09 ASSESSMENT — SOCIAL DETERMINANTS OF HEALTH (SDOH)

## 2022-08-09 NOTE — PROGRESS NOTES
[address] [facility]                                             [address], [facility], [address] [address]        Phone: [phone] [facility], was evaluated through a synchronous (real-time) audio-video encounter. The patient (or guardian if applicable) is aware that this is a billable service, which includes applicable co-pays. This Virtual Visit was conducted with patient's (and/or legal guardian's) consent. The visit was conducted pursuant to the emergency declaration under the [address] waiver authority and the [employer] General Act. Patient identification was verified, and a caregiver was present when appropriate. The patient was located at Home: [address] [place] [address]. Provider was located at [facility] ([address] [place], [place] [address]): [address] [address]. Total time spent for this encounter: Not billed by time    --Sindhu Piña DO on 2022 at 2:08 PM    An electronic signature was used to authenticate this note. Name:  Luann Mcclure  :    1965      Consultants:   Patient Care Team:  Sindhu Piña DO as PCP - General (Family Medicine)  Brain KANDICE Hernandez CNP as PCP - REHABILITATION [facility] Empaneled Provider  Kim Cavazos MD as Consulting Physician (Pulmonology)    Chief Complaint:     Luann Mcclure is a 61 y.o. male  who presents today for a New Patient care visit with Personalized Prevention Plan Services as noted below. HPI:     Patient is a 67-year-old male with past medical history of paroxysmal A. fib, SHARRI, prediabetes, history of renal calculi, history of erectile dysfunction. Patient presents virtually today in order to establish care. He has also schedule a be well visit in September.   Today patient within 0.2% of this last lab. Patient states that is mostly controlled with diet and exercise. History of renal calculi  Patient states that he has longstanding history of renal stones. He had a prior lithotripsy 1993. Last episode of renal calculi was in 2018. Patient does not have any difficulty urinating, dysuria, urgency or frequency. History of erectile dysfunction  Today patient does not complaining of any decreased libido erectile dysfunction. He does not currently take any medications for this. Patient Active Problem List   Diagnosis    Hypotestosteronism    Erectile dysfunction    Paroxysmal atrial fibrillation (HCC)    Other fatigue    A-fib (HCC)    RBBB    Mild obstructive sleep apnea    Obesity (BMI 30-39. 9)         Past Medical History:    Past Medical History:   Diagnosis Date    Mild obstructive sleep apnea 11/17/2020       Past Surgical History:  No past surgical history on file. Radiofrequency catheter ablation-7/8/2020  Tonsillectomy  Cystoscopy-1993  Prior chest tube placement    Home Meds:  Prior to Visit Medications    Medication Sig Taking? Authorizing Provider   apixaban (ELIQUIS) 5 MG TABS tablet Take 1 tablet by mouth 2 times daily Yes Minus Form., MD       Allergies:    Patient has no known allergies. Family History:   No family history on file.   Father-diabetes mellitus, prostate cancer, hypertension, CABG with quadruple bypass, heart disease    Social History:  Social History       Tobacco History       Smoking Status  Never      Smokeless Tobacco Use  Never              Alcohol History       Alcohol Use Status  Yes Comment  rarely              Drug Use       Drug Use Status  Never              Sexual Activity       Sexually Active  Not Asked                       Health Maintenance Completed:  Health Maintenance   Topic Date Due    HIV screen  Never done    Colorectal Cancer Screen  Never done    Shingles vaccine (1 of 2) Never done    COVID-19 Vaccine (4 - Booster for Moderna series) 04/15/2022    A1C test (Diabetic or Prediabetic)  09/08/2022    Prostate Specific Antigen (PSA) Screening or Monitoring  09/08/2022    Flu vaccine (1) 09/01/2022    Depression Screen  08/09/2023    Lipids  09/08/2026    DTaP/Tdap/Td vaccine (2 - Td or Tdap) 09/15/2030    Hepatitis C screen  Completed    Hepatitis A vaccine  Aged Out    Hepatitis B vaccine  Aged Out    Hib vaccine  Aged Out    Meningococcal (ACWY) vaccine  Aged Out    Pneumococcal 0-64 years Vaccine  Aged [employer] History   Administered Date(s) Administered    COVID-19, MODERNA BLUE border, Primary or Immunocompromised, (age 12y+), IM, 100 mcg/0.5mL 01/07/2021, 02/04/2021    COVID-19, PFIZER PURPLE top, DILUTE for use, (age 15 y+), 30mcg/0.3mL 12/15/2021    Influenza Virus Vaccine 10/21/2021    Tdap (Boostrix, Adacel) 09/15/2020         Review of Systems:  Review of Systems   Constitutional:  Negative for chills, fatigue and fever. HENT: Negative. Eyes: Negative. Respiratory:  Negative for cough, chest tightness, shortness of breath and wheezing. Cardiovascular:  Negative for chest pain, palpitations and leg swelling. Gastrointestinal:  Negative for abdominal pain, constipation, diarrhea, nausea and vomiting. Endocrine: Negative. Genitourinary:  Negative for difficulty urinating and dysuria. Musculoskeletal:  Negative for arthralgias, back pain and myalgias. Skin: Negative. Neurological:  Negative for tremors, seizures, numbness and headaches. Psychiatric/Behavioral: Negative. Physical Exam:   There were no vitals filed for this visit. There is no height or weight on file to calculate BMI.     Wt Readings from Last 3 Encounters:   01/27/22 253 lb 9.6 oz (115 kg)   09/08/21 258 lb 6.4 oz (117.2 kg)   12/10/20 264 lb (119.7 kg)       BP Readings from Last 3 Encounters:   01/27/22 120/62   09/08/21 112/62   12/10/20 110/60       Physical Exam  Constitutional: Future    Obesity (BMI 30-39.9)  -     TSH with Reflex; Future  -     LIPID PANEL; Future    Mild obstructive sleep apnea  -     CBC with Auto Differential; Future    Hx of erectile dysfunction    Hx of renal calculi    Healthcare maintenance  -     HIV Screen; Future    Prediabetes  -     Hemoglobin A1C; Future      Patient is a 67-year-old male with past medical history of paroxysmal A. fib, SHARRI, prediabetes, history of renal calculi, history of erectile dysfunction. Patient presents virtually today in order to establish care. He has also schedule a be well visit in September. Paroxysmal atrial fibrillation  Well-controlled, at goal  Patient currently taking Eliquis 5 mg twice daily. Would recommend patient continue with this regimen. Patient states that he is being followed by cardiology for this  Patient currently sees Dr. Jaimee Bustamante. Recommend the patient continue to follow. Return to clinic in 5 weeks or sooner if patient has exacerbation. Given that visit was virtual was not able to assess or auscultative patient was in normal sinus rhythm or in A. fib. SHARRI  Status unknown  Patient has currently prescribed CPAP. However he does not use it. I encourage patient to restart despite his discomfort. Patient should continue to see Ruby Paulino NP for sleep medicine. Will continue to monitor for any daytime fatigue, BP, leg swelling for the time being. RTC in 5 weeks for be well visit to reassess. Ordered CBC today    Prediabetes/Obesity  Well-controlled, not a goal  Patient currently manages with conservative treatment with diet and exercise. I advised patient to continue this regiment for the time being. Ordered CMP, A1c, TSH (obesity) and lipid panel today. We will follow-up on lab results of patient's be will visit. History of renal calculi  Well-controlled, at goal  Patient has not had subsequent renal calculi since 2018.   Patient advised on proper dietary changes (low calcium) and increase fluid hydration with water. Ordered CMP today. Return to clinic in 5 weeks for lab evaluation and be well visit. History of erectile dysfunction  Well-controlled, at goal  Patient asymptomatic at today's visit. Patient not currently taking medications for this  Can reevaluate at be well visit in 5 weeks. Healthcare maintenance  Order HIV screen today. Health Maintenance Due:  Health Maintenance Due   Topic Date Due    HIV screen  Never done    Colorectal Cancer Screen  Never done    Shingles vaccine (1 of 2) Never done    COVID-19 Vaccine (4 - Booster for Moderna series) 04/15/2022    A1C test (Diabetic or Prediabetic)  09/08/2022    Prostate Specific Antigen (PSA) Screening or Monitoring  09/08/2022        Health care decision maker:  <72years old      Health Maintenance: (USPSTF Recommendations)  (M) Prostate Cancer Screen: (54-79 yo discuss benefits/harm, does not recommend testing PSA in men >75 yo (D): Patient completed PSA 9/8/2021 which was normal.  Patient asymptomatic today will not recheck. (M) AAA Screen: (men 73-67 yo who has ever smoked (B), consider in nonsmokers if high risk): Patient not 72years old  CRC/Colonoscopy Screening: (adults 39-53 (B), 54-65 (A)) patient's last colonoscopy was in 2014 or 2015 which was normal.  Patient not expected to complete 1 until 10 years after her last colonoscopy. Patient due in approximately 2 to 3 years. Lung Ca Screening: Annual LDCT (+smoker age 49-80, smoked within 15 years, total of 20 pack yr history (B)): Patient is non-smoker. DEXA Screen: (women >65 and older, <65 if at risk/postmenopausal (B))  HIV Screen: (16-65 yr old, and all pregnant patients (A)): Ordered at this visit  Hep C Screen: (18-79 yr old (B)): Completed 2021  [facility] [address] Screen: (all pts with cirrhosis and high risk Hep B (US q6 mo)):  Immunizations:    RTC:  Return in about 5 weeks (around 9/13/2022).      EMR Dragon/transcription disclaimer:  Much of this encounter note is electronic transcription/translation of spoken language to printed texts. The electronic translation of spoken language may be erroneous, or at times, nonsensical words or phrases may be inadvertently transcribed.   Although I have reviewed the note for such errors, some may still exist.

## 2022-09-21 DIAGNOSIS — I48.0 PAROXYSMAL ATRIAL FIBRILLATION (HCC): ICD-10-CM

## 2022-09-21 DIAGNOSIS — Z00.00 HEALTHCARE MAINTENANCE: ICD-10-CM

## 2022-09-21 DIAGNOSIS — G47.33 MILD OBSTRUCTIVE SLEEP APNEA: ICD-10-CM

## 2022-09-21 DIAGNOSIS — E66.9 OBESITY (BMI 30-39.9): ICD-10-CM

## 2022-09-21 DIAGNOSIS — R73.03 PREDIABETES: ICD-10-CM

## 2022-09-21 LAB
A/G RATIO: 1.9 (ref 1.1–2.2)
ALBUMIN SERPL-MCNC: 4.6 G/DL (ref 3.4–5)
ALP BLD-CCNC: 73 U/L (ref 40–129)
ALT SERPL-CCNC: 18 U/L (ref 10–40)
ANION GAP SERPL CALCULATED.3IONS-SCNC: 11 MMOL/L (ref 3–16)
AST SERPL-CCNC: 18 U/L (ref 15–37)
BASOPHILS ABSOLUTE: 0.1 K/UL (ref 0–0.2)
BASOPHILS RELATIVE PERCENT: 1 %
BILIRUB SERPL-MCNC: 0.9 MG/DL (ref 0–1)
BUN BLDV-MCNC: 17 MG/DL (ref 7–20)
CALCIUM SERPL-MCNC: 9.5 MG/DL (ref 8.3–10.6)
CHLORIDE BLD-SCNC: 103 MMOL/L (ref 99–110)
CHOLESTEROL, TOTAL: 172 MG/DL (ref 0–199)
CO2: 28 MMOL/L (ref 21–32)
CREAT SERPL-MCNC: 1 MG/DL (ref 0.9–1.3)
EOSINOPHILS ABSOLUTE: 0.1 K/UL (ref 0–0.6)
EOSINOPHILS RELATIVE PERCENT: 2.1 %
GFR AFRICAN AMERICAN: >60
GFR NON-AFRICAN AMERICAN: >60
GLUCOSE BLD-MCNC: 95 MG/DL (ref 70–99)
HCT VFR BLD CALC: 43.6 % (ref 40.5–52.5)
HDLC SERPL-MCNC: 67 MG/DL (ref 40–60)
HEMOGLOBIN: 14.5 G/DL (ref 13.5–17.5)
LDL CHOLESTEROL CALCULATED: 96 MG/DL
LYMPHOCYTES ABSOLUTE: 1.2 K/UL (ref 1–5.1)
LYMPHOCYTES RELATIVE PERCENT: 19.3 %
MCH RBC QN AUTO: 28.1 PG (ref 26–34)
MCHC RBC AUTO-ENTMCNC: 33.3 G/DL (ref 31–36)
MCV RBC AUTO: 84.5 FL (ref 80–100)
MONOCYTES ABSOLUTE: 0.6 K/UL (ref 0–1.3)
MONOCYTES RELATIVE PERCENT: 9.2 %
NEUTROPHILS ABSOLUTE: 4.4 K/UL (ref 1.7–7.7)
NEUTROPHILS RELATIVE PERCENT: 68.4 %
PDW BLD-RTO: 14.6 % (ref 12.4–15.4)
PLATELET # BLD: 218 K/UL (ref 135–450)
PMV BLD AUTO: 8.7 FL (ref 5–10.5)
POTASSIUM SERPL-SCNC: 4.8 MMOL/L (ref 3.5–5.1)
RBC # BLD: 5.17 M/UL (ref 4.2–5.9)
SODIUM BLD-SCNC: 142 MMOL/L (ref 136–145)
TOTAL PROTEIN: 7 G/DL (ref 6.4–8.2)
TRIGL SERPL-MCNC: 47 MG/DL (ref 0–150)
TSH REFLEX: 1.32 UIU/ML (ref 0.27–4.2)
VLDLC SERPL CALC-MCNC: 9 MG/DL
WBC # BLD: 6.5 K/UL (ref 4–11)

## 2022-09-22 LAB
ESTIMATED AVERAGE GLUCOSE: 125.5 MG/DL
HBA1C MFR BLD: 6 %
HIV AG/AB: NORMAL
HIV ANTIGEN: NORMAL
HIV-1 ANTIBODY: NORMAL
HIV-2 AB: NORMAL

## 2022-09-25 NOTE — PROGRESS NOTES
[address] [facility]                                             [address] [facility], [address] [place] [address], [address] [place] [address]        Phone: [phone]      Name:  Maury Davis  :    1965    Consultants:   Patient Care Team:  Ester Lane DO as PCP - General (Family Medicine)  KANDICE Osborne - CNP as PCP - [facility]  Dar Kidd MD as Consulting Physician (Pulmonology)    Chief Complaint:     Maury Davis is a 61 y.o. male  who presents today for an established patient care visit with Personalized Prevention Plan Services as noted below. HPI:     Paola Monterroso is a 61year old male with PMH of paroxysmal A.fib, SHARRI, prediabetes, and hx of kidney stones who presents to the office today for Be Well exam    Patient feeling well today. No acute concerns or symptoms at this time    Paroxysmal A.fib  Around January-February in  he was cutting the grass and could not finish the front yard without becoming short of breath. He detected an abnormal rhythm on his Apple watch at the time and was warned to go see a medical professional.  Patient eventually managed with a cardioversion done in 2020, however his sinus rhythm lasted 3-4 days, and returned to A.fib. Patient then had an ablation done in 2020. Since then he has felt well, however he has had a few breakthrough episodes of A.fib. He follows with Dr. Ariadne Boyce and advised that his A.fib may come and go the rest of his life.    Takes Eliquis 5 mg  Denies headache, dyspnea, lightheadedness, palpitations, chest pain, edema, tremor, bleeding, bruising, or syncope    Prediabetes  Patient has a history of multiple A1c's in the prediabetes range (6.0, 5.8, 6.1)  His blood sugars have always been within the normal range of <100  He is an extremely active individual, would play basketball most nights of the week  Will use the treadmill at home and walk around the neighborhood  Has a bow flex machine that uses a few times a week. He reports that he goes through cycles with his diet, he is currently eating too much fast food at this time and will work on decrease the amount. Reports eating fruits and vegetables and attempts to limit sweets. SHARRI  Patient unsure if he officially has SHARRI. He had an inconclusive study done a few years ago, and was given a CPAP at home for treatment of SHARRI  He reports he has not used his machine in 6 months. He is unable to sleep with the machine, it keeps him up at night. Reports sleeping well without CPAP, only will awake to his dogs barking and asking to go outside. Does not awake for other reasons such as apnea or urinary frequency. Denies snoring, apneic breathing  He sleeps in an adjustable bed  Has appointment on January 3rd with sleep group    Health Maintenance  Had Colonoscopy 8 years ago in [address]  Wants flu shot today  Would like to get Covid booster      Patient Active Problem List   Diagnosis    Hypotestosteronism    Erectile dysfunction    Paroxysmal atrial fibrillation (HCC)    Other fatigue    A-fib (HCC)    RBBB    Mild obstructive sleep apnea    Obesity (BMI 30-39. 9)         Past Medical History:    Past Medical History:   Diagnosis Date    Mild obstructive sleep apnea 11/17/2020       Past Surgical History:  No past surgical history on file. Home Meds:  Prior to Visit Medications    Medication Sig Taking? Authorizing Provider   apixaban (ELIQUIS) 5 MG TABS tablet Take 1 tablet by mouth 2 times daily Yes Luci Henao MD       Allergies:    Patient has no known allergies. Family History:   No family history on file.       Health Maintenance Completed:  Health Maintenance   Topic Date Due    Colorectal Cancer Screen  Never done    COVID-19 Vaccine (4 - Booster for Moderna series) 04/15/2022    Shingles vaccine (1 of 2) 09/26/2023 (Originally 7/12/2014) Depression Screen  08/09/2023    A1C test (Diabetic or Prediabetic)  09/21/2023    Lipids  09/21/2027    DTaP/Tdap/Td vaccine (2 - Td or Tdap) 09/15/2030    Flu vaccine  Completed    Hepatitis C screen  Completed    HIV screen  Completed    Hepatitis A vaccine  Aged Out    Hepatitis B vaccine  Aged Out    Hib vaccine  Aged Out    Meningococcal (ACWY) vaccine  Aged Out    Pneumococcal 0-64 years Vaccine  Aged [employer] History   Administered Date(s) Administered    COVID-19, MODERNA BLUE border, Primary or Immunocompromised, (age 12y+), IM, 100 mcg/0.5mL 01/07/2021, 02/04/2021    COVID-19, PFIZER PURPLE top, DILUTE for use, (age 15 y+), 30mcg/0.3mL 12/15/2021    Influenza Virus Vaccine 10/21/2021    Influenza, FLUCELVAX, (age 10 mo+), MDCK, PF, 0.5mL 09/26/2022    Tdap (Boostrix, Adacel) 09/15/2020         Review of Systems:  Review of Systems   Constitutional:  Negative for activity change and appetite change. HENT:  Negative for congestion, sinus pain and sore throat. Eyes:  Negative for pain. Respiratory:  Negative for cough and shortness of breath. Cardiovascular:  Negative for chest pain, palpitations and leg swelling. Gastrointestinal:  Negative for nausea and vomiting. Genitourinary:  Negative for difficulty urinating. Musculoskeletal:  Negative for arthralgias and myalgias. Neurological:  Negative for dizziness, weakness, light-headedness and headaches. Psychiatric/Behavioral:  Negative for agitation and behavioral problems. Physical Exam:   Vitals:    09/26/22 1434   BP: 122/62   Site: Left Upper Arm   Position: Sitting   Cuff Size: Large Adult   Pulse: 74   Temp: 97.3 °F (36.3 °C)   TempSrc: Temporal   SpO2: 98%   Weight: 260 lb 0.6 oz (118 kg)   Height: 5' 11.65\" (1.82 m)     Body mass index is 35.61 kg/m².     Wt Readings from Last 3 Encounters:   09/26/22 260 lb 0.6 oz (118 kg)   01/27/22 253 lb 9.6 oz (115 kg)   09/08/21 258 lb 6.4 oz (117.2 kg)       BP Readings from Last 3 Encounters:   09/26/22 122/62   01/27/22 120/62   09/08/21 112/62       Physical Exam  Constitutional:       Appearance: Normal appearance. HENT:      Head: Normocephalic and atraumatic. Eyes:      Extraocular Movements: Extraocular movements intact. Conjunctiva/sclera: Conjunctivae normal.   Cardiovascular:      Rate and Rhythm: Normal rate and regular rhythm. Pulses: Normal pulses. Heart sounds: Normal heart sounds. Pulmonary:      Effort: Pulmonary effort is normal.      Breath sounds: Normal breath sounds. Abdominal:      Palpations: Abdomen is soft. Tenderness: There is no abdominal tenderness. There is no guarding. Musculoskeletal:         General: Normal range of motion. Skin:     General: Skin is warm and dry. Capillary Refill: Capillary refill takes less than 2 seconds. Neurological:      General: No focal deficit present. Mental Status: He is alert and oriented to person, place, and time.    Psychiatric:         Mood and Affect: Mood normal.         Behavior: Behavior normal.            Lab Review:   Orders Only on 09/21/2022   Component Date Value    HIV Ag/Ab 09/21/2022 Non-Reactive     HIV-1 Antibody 09/21/2022 Non-Reactive     HIV ANTIGEN 09/21/2022 Non-Reactive     HIV-2 Ab 09/21/2022 Non-Reactive     Cholesterol, Total 09/21/2022 172     Triglycerides 09/21/2022 47     HDL 09/21/2022 67 (A)     LDL Calculated 09/21/2022 96     VLDL Cholesterol Calcula* 09/21/2022 9     TSH 09/21/2022 1.32     Hemoglobin A1C 09/21/2022 6.0     eAG 09/21/2022 125.5     Sodium 09/21/2022 142     Potassium 09/21/2022 4.8     Chloride 09/21/2022 103     CO2 09/21/2022 28     Anion Gap 09/21/2022 11     Glucose 09/21/2022 95     BUN 09/21/2022 17     Creatinine 09/21/2022 1.0     GFR Non- 09/21/2022 >60     GFR  09/21/2022 >60     Calcium 09/21/2022 9.5     Total Protein 09/21/2022 7.0     Albumin 09/21/2022 4.6     Albumin/Globulin Ratio 09/21/2022 1.9     Total Bilirubin 09/21/2022 0.9     Alkaline Phosphatase 09/21/2022 73     ALT 09/21/2022 18     AST 09/21/2022 18     WBC 09/21/2022 6.5     RBC 09/21/2022 5.17     Hemoglobin 09/21/2022 14.5     Hematocrit 09/21/2022 43.6     MCV 09/21/2022 84.5     MCH 09/21/2022 28.1     MCHC 09/21/2022 33.3     RDW 09/21/2022 14.6     Platelets 09/31/6269 218     MPV 09/21/2022 8.7     Neutrophils % 09/21/2022 68.4     Lymphocytes % 09/21/2022 19.3     Monocytes % 09/21/2022 9.2     Eosinophils % 09/21/2022 2.1     Basophils % 09/21/2022 1.0     Neutrophils Absolute 09/21/2022 4.4     Lymphocytes Absolute 09/21/2022 1.2     Monocytes Absolute 09/21/2022 0.6     Eosinophils Absolute 09/21/2022 0.1     Basophils Absolute 09/21/2022 0.1           Assessment/Plan:  Colton Rao is a 61year old male with PMH of paroxysmal A.fib, SHARRI, prediabetes, and hx of kidney stones who presents to the office today for Be Well exam    Diagnoses and all orders for this visit:    Well adult exam  -Patient is a healthy-appearing 79-year-old male with no acute complaints today  -Recent lab work done on 9/21/2022 included  -CMP which shows normal kidney function, normal electrolytes, normal glucose   -Lipid panel which shows normal cholesterol of 172 and an LDL of 96   -Liver profile that showed normal liver enzymes   -TSH normal at 1.32   -Hemoglobin A1c of 6.0, previous A1c done on 9/8/2021 was 5.8   - CBC showed blood counts wnl  -The 10-year ASCVD risk score (Larry DAO, et al., 2019) is: 4.6%    Values used to calculate the score:      Age: 61 years      Sex: Male      Is Non- : No      Diabetic: No      Tobacco smoker: No      Systolic Blood Pressure: 890 mmHg      Is BP treated: No      HDL Cholesterol: 67 mg/dL      Total Cholesterol: 172 mg/dL  - No need to start a statin medication at this time    2.  Paroxysmal atrial fibrillation (HCC)  - Well-controlled  - Continue Eliquis 5 mg  - Follow up with Dr. Kirit Herring as scheduled  [id number] Score for Atrial Fibrillation Stroke Risk   Risk   Factors  Component Value   C CHF No 0   H HTN No 0   A2 Age >= 75 No,  (58 y.o.) 0   D DM No 0   S2 Prior Stroke/TIA No 0   V Vascular Disease No 0   A Age 74-69 No,  (58 y.o.) 0   Sc Sex male 0    [id number]  Score  0   Score last updated 9/27/22 11:28 AM EDT      HAS-BLED Score                            Risk Factors      Points   Hypertension  Uncontrolled, >166 mmHg systolic   No=0   Renal disease  Dialysis, transplant, Cr>2.26 mg/dL or >200 µmol/L   No=0   Liver disease   Cirrhosis or bilirubin >2x normal with AST/ALT/AP >3x normal   No=0   Stroke history   No=0   Prior major bleeding or predisposition to bleeding     No=0   Labile  INR  Unstable/high INRs, time in therapeutic range <60%   No=0   Age >71   No=0   Medication usage predisposing to bleeding   Aspirin, clopidogrel, NSAIDs   No=0   Alcohol use   >7 drinks/week   No=0     HAS-Bled score of 0    3. Prediabetes  - Moderately controlled at this time  - Patient recent A1c's have been 6.0, 5.8, and 6.1 over the previous 2 years. - Patient advised about risks of CVD with prediabetes and the risk of progressing to diabetes. - Advised patient that he can start metformin to help prevent progression to diabetes as well as the benefit for CVD, patient declines medication at this time  - Advised patient that a referral to diabetes education can help inform patient about diet and education options, patient declines at this time  -Recommend 150 minutes of cardiovascular activity a week, moderate intensity  -Increase intake of fruits and vegetables  -Minimize packaged foods  - As patient appears otherwise healthy and has a history of normal glucose readings, offered patient preference for follow up between between 3-6 months. He will follow up in 6 months for prediabetes management    4.  Obesity (BMI 30-39.9)  -Recommend 150 minutes of cardiovascular activity a week, moderate intensity  -Increase  intake of fruits and vegetables  -Minimize packaged foods    5. Health Maintenance  - Received flu vaccine today  - Had colonoscopy done 8 years ago in [facility], will forward results to office  - Will schedule appointment for Covid booster      Health Maintenance Due:  Health Maintenance Due   Topic Date Due    Colorectal Cancer Screen  Never done    COVID-19 Vaccine (4 - Booster for Milo Heather series) 04/15/2022        Health care decision maker:  <72years old      Health Maintenance: (USPSTF Recommendations)  (M) Prostate Cancer Screen: (54-79 yo discuss benefits/harm, does not recommend testing PSA in men >73 yo (D):   (M) AAA Screen: (men 73-67 yo who has ever smoked (B), consider in nonsmokers if high risk):  CRC/Colonoscopy Screening: (adults 39-53 (B), 50-75 (A))  Lung Ca Screening: Annual LDCT (+smoker age 49-80, smoked within 15 years, total of 20 pack yr history (B)):  DEXA Screen: (women >65 and older, <65 if at risk/postmenopausal (B))  HIV Screen: (16-65 yr old, and all pregnant patients (A)): Hep C Screen: (18-79 yr old (B)):  HCC Screen: (all pts with cirrhosis and high risk Hep B (US q6 mo)):  Immunizations:    RTC:  Return in about 6 months (around 3/26/2023) for Follow up for prediabetes management. EMR Dragon/transcription disclaimer:  Much of this encounter note is electronic transcription/translation of spoken language to printed texts. The electronic translation of spoken language may be erroneous, or at times, nonsensical words or phrases may be inadvertently transcribed.   Although I have reviewed the note for such errors, some may still exist.     Elizabeth Ferris, DO  PGY1 Family Medicine Resident  [address] [place] [address] Medicine Residency

## 2022-09-26 ENCOUNTER — OFFICE VISIT (OUTPATIENT)
Dept: PRIMARY CARE CLINIC | Age: 57
End: 2022-09-26
Payer: COMMERCIAL

## 2022-09-26 VITALS
DIASTOLIC BLOOD PRESSURE: 62 MMHG | HEIGHT: 72 IN | HEART RATE: 74 BPM | OXYGEN SATURATION: 98 % | WEIGHT: 260.04 LBS | BODY MASS INDEX: 35.22 KG/M2 | TEMPERATURE: 97.3 F | SYSTOLIC BLOOD PRESSURE: 122 MMHG

## 2022-09-26 DIAGNOSIS — I48.0 PAROXYSMAL ATRIAL FIBRILLATION (HCC): ICD-10-CM

## 2022-09-26 DIAGNOSIS — R73.03 PREDIABETES: ICD-10-CM

## 2022-09-26 DIAGNOSIS — Z23 NEED FOR PROPHYLACTIC VACCINATION AND INOCULATION AGAINST VARICELLA: ICD-10-CM

## 2022-09-26 DIAGNOSIS — E66.9 OBESITY (BMI 30-39.9): ICD-10-CM

## 2022-09-26 DIAGNOSIS — Z00.00 WELL ADULT EXAM: Primary | ICD-10-CM

## 2022-09-26 PROCEDURE — 90471 IMMUNIZATION ADMIN: CPT | Performed by: FAMILY MEDICINE

## 2022-09-26 PROCEDURE — 90674 CCIIV4 VAC NO PRSV 0.5 ML IM: CPT | Performed by: FAMILY MEDICINE

## 2022-09-26 PROCEDURE — 99396 PREV VISIT EST AGE 40-64: CPT

## 2022-09-26 SDOH — ECONOMIC STABILITY: FOOD INSECURITY: WITHIN THE PAST 12 MONTHS, THE FOOD YOU BOUGHT JUST DIDN'T LAST AND YOU DIDN'T HAVE MONEY TO GET MORE.: NEVER TRUE

## 2022-09-26 SDOH — ECONOMIC STABILITY: FOOD INSECURITY: WITHIN THE PAST 12 MONTHS, YOU WORRIED THAT YOUR FOOD WOULD RUN OUT BEFORE YOU GOT MONEY TO BUY MORE.: NEVER TRUE

## 2022-09-26 ASSESSMENT — SOCIAL DETERMINANTS OF HEALTH (SDOH): HOW HARD IS IT FOR YOU TO PAY FOR THE VERY BASICS LIKE FOOD, HOUSING, MEDICAL CARE, AND HEATING?: NOT HARD AT ALL

## 2022-09-26 ASSESSMENT — ANXIETY QUESTIONNAIRES
IF YOU CHECKED OFF ANY PROBLEMS ON THIS QUESTIONNAIRE, HOW DIFFICULT HAVE THESE PROBLEMS MADE IT FOR YOU TO DO YOUR WORK, TAKE CARE OF THINGS AT HOME, OR GET ALONG WITH OTHER PEOPLE: NOT DIFFICULT AT ALL
6. BECOMING EASILY ANNOYED OR IRRITABLE: 0
GAD7 TOTAL SCORE: 0
1. FEELING NERVOUS, ANXIOUS, OR ON EDGE: 0
3. WORRYING TOO MUCH ABOUT DIFFERENT THINGS: 0
5. BEING SO RESTLESS THAT IT IS HARD TO SIT STILL: 0
7. FEELING AFRAID AS IF SOMETHING AWFUL MIGHT HAPPEN: 0
2. NOT BEING ABLE TO STOP OR CONTROL WORRYING: 0
4. TROUBLE RELAXING: 0

## 2022-09-26 ASSESSMENT — ENCOUNTER SYMPTOMS
SORE THROAT: 0
EYE PAIN: 0
VOMITING: 0
SINUS PAIN: 0
NAUSEA: 0
COUGH: 0
SHORTNESS OF BREATH: 0

## 2022-09-26 ASSESSMENT — PATIENT HEALTH QUESTIONNAIRE - PHQ9
SUM OF ALL RESPONSES TO PHQ QUESTIONS 1-9: 0
3. TROUBLE FALLING OR STAYING ASLEEP: 0
10. IF YOU CHECKED OFF ANY PROBLEMS, HOW DIFFICULT HAVE THESE PROBLEMS MADE IT FOR YOU TO DO YOUR WORK, TAKE CARE OF THINGS AT HOME, OR GET ALONG WITH OTHER PEOPLE: 0
9. THOUGHTS THAT YOU WOULD BE BETTER OFF DEAD, OR OF HURTING YOURSELF: 0
SUM OF ALL RESPONSES TO PHQ QUESTIONS 1-9: 0
6. FEELING BAD ABOUT YOURSELF - OR THAT YOU ARE A FAILURE OR HAVE LET YOURSELF OR YOUR FAMILY DOWN: 0
1. LITTLE INTEREST OR PLEASURE IN DOING THINGS: 0
SUM OF ALL RESPONSES TO PHQ QUESTIONS 1-9: 0
5. POOR APPETITE OR OVEREATING: 0
7. TROUBLE CONCENTRATING ON THINGS, SUCH AS READING THE NEWSPAPER OR WATCHING TELEVISION: 0
4. FEELING TIRED OR HAVING LITTLE ENERGY: 0
2. FEELING DOWN, DEPRESSED OR HOPELESS: 0
8. MOVING OR SPEAKING SO SLOWLY THAT OTHER PEOPLE COULD HAVE NOTICED. OR THE OPPOSITE, BEING SO FIGETY OR RESTLESS THAT YOU HAVE BEEN MOVING AROUND A LOT MORE THAN USUAL: 0
SUM OF ALL RESPONSES TO PHQ QUESTIONS 1-9: 0
SUM OF ALL RESPONSES TO PHQ9 QUESTIONS 1 & 2: 0

## 2023-01-03 ENCOUNTER — TELEPHONE (OUTPATIENT)
Dept: PULMONOLOGY | Age: 58
End: 2023-01-03

## 2023-01-03 NOTE — TELEPHONE ENCOUNTER
Spoke to patient and he said that he will call back to schedule an appt at a later date. Patient said that he does not believe he has sleep apnea and that the HST was in-valid. Patient declined to schedule follow up at this time.

## 2023-01-03 NOTE — TELEPHONE ENCOUNTER
Noted. Patient did not keep follow up appointment as was recommended. Please schedule a follow up visit for this patient if he calls requesting such appointment.      We could do in lab sleep study if he would prefer, however I do not see any indications that HST was not valid but I would be happy to discuss

## 2023-01-03 NOTE — TELEPHONE ENCOUNTER
Patient cancelled appointment on 01/04/23 with Lorna Chahal CNP for 1 yr sleep. Reason: not using machine, more of hindrance then help, not pursuing treatment     Patient did not reschedule appointment. Appointment rescheduled for na. Last OV       Assessment: 01/04/22      Mild SHARRI. Auto CPAP 8-12 cm H2O. Sub optimal compliance on review today. Snoring- resolved on CPAP  Observed sleep apnea- resolved on CPAP  Fatigue  A. fib-followed by cardiology  Obesity     Plan:        -Continue auto CPAP 8-12 cm H2O  -Discussed CPAP acclamation techniques  -Discussed alternative treatments for SHARRI. Patient prefers to continue with CPAP at this time. - Advised to use CPAP 6-8 hrs at night and during naps- continue to increase use. - Replacement of mask, tubing, head straps every 3-6 months or sooner if damaged. - Patient instructed to contact [facility] for any mask, tubing or machine trouble shooting if problems arise.  - Sleep hygiene  -Cognitive behavioral therapy was discussed with patient including stimulus control and sleep restriction  - Avoid sedatives, alcohol and caffeinated drinks at bed time. - Patient counseled to never drive or operate heavy machinery while fatigue, drowsy or sleepy.    - Weight loss is recommended as a long-term intervention.    - Complications of SHARRI if not treated were discussed with patient patient, including: systemic hypertension, pulmonary hypertension, cardiovascular morbidities, car accidents and all cause mortality.  -Patient education regarding sleep tips and CPAP cleaning recommendations      Follow up:1 year, sooner if needed

## 2023-01-03 NOTE — TELEPHONE ENCOUNTER
I recommend patient schedule appointment to discuss barriers to CPAP use/alternative treatment options

## 2023-01-06 NOTE — TELEPHONE ENCOUNTER
Patient declined in lab study.  Chang Melo he will call us if he feels like making follow up/completing study

## 2023-02-27 DIAGNOSIS — I48.0 PAROXYSMAL ATRIAL FIBRILLATION (HCC): ICD-10-CM

## 2023-02-28 RX ORDER — APIXABAN 5 MG/1
TABLET, FILM COATED ORAL
Qty: 180 TABLET | Refills: 1 | Status: SHIPPED | OUTPATIENT
Start: 2023-02-28

## 2023-04-20 ENCOUNTER — OFFICE VISIT (OUTPATIENT)
Dept: CARDIOLOGY CLINIC | Age: 58
End: 2023-04-20
Payer: COMMERCIAL

## 2023-04-20 VITALS
DIASTOLIC BLOOD PRESSURE: 76 MMHG | SYSTOLIC BLOOD PRESSURE: 114 MMHG | HEIGHT: 73 IN | WEIGHT: 270 LBS | HEART RATE: 86 BPM | BODY MASS INDEX: 35.78 KG/M2

## 2023-04-20 DIAGNOSIS — I48.0 PAROXYSMAL ATRIAL FIBRILLATION (HCC): Primary | ICD-10-CM

## 2023-04-20 PROCEDURE — 99214 OFFICE O/P EST MOD 30 MIN: CPT | Performed by: INTERNAL MEDICINE

## 2023-04-20 PROCEDURE — 93000 ELECTROCARDIOGRAM COMPLETE: CPT | Performed by: INTERNAL MEDICINE

## 2023-04-20 RX ORDER — METOPROLOL SUCCINATE 25 MG/1
12.5 TABLET, EXTENDED RELEASE ORAL AS NEEDED
Qty: 30 TABLET | Refills: 3 | Status: SHIPPED | OUTPATIENT
Start: 2023-04-20

## 2023-04-20 RX ORDER — FLECAINIDE ACETATE 100 MG/1
100 TABLET ORAL AS NEEDED
Qty: 60 TABLET | Refills: 3 | Status: SHIPPED | OUTPATIENT
Start: 2023-04-20

## 2023-05-24 DIAGNOSIS — I48.0 PAROXYSMAL ATRIAL FIBRILLATION (HCC): ICD-10-CM

## 2024-03-11 NOTE — PROGRESS NOTES
Continue apixaban.  - Follow up in 6 months with me.    03/12/2024  Patient presents from home for follow up.  He has been doing well.  No recurrent atrial fibrillation clinically or on his watch.  He is tolerating his OAC.  - Start PRN flecainide and metoprolol 1 hour before exercise.  Patient may take no more than 300 mg of flecainide in a day.  - Continue apixaban.  - Follow up in 12 months with me.    2.  Palpitations  - Plan as per above.    3.  Fatigue  - Plan as per above.    RECOMMENDATIONS:  Continue to monitor clinically for recurrence of arrhythmia.   Continue current medications. Flecainide as needed.   Annual lab work due.   Follow up in one year with me.     QUALITY MEASURES  1. Tobacco Cessation Counseling: NA  2. Retake of BP if >140/90:   NA  3. Documentation to PCP/referring for new patient:  Sent to PCP at close of office visit  4. CAD patient on anti-platelet: NA  5. CAD patient on STATIN therapy:  NA  6. Patient with CHF and aFib on anticoagulation:  Yes     Loren MALDONADO RN, am scribing for and in the presence of Dr. Lorenzo White. 03/12/24 9:29 AM   Loren Aguirre RN    The scribe's documentation has been prepared under my direction and personally reviewed by me in its entirety. I confirm that the note above accurately reflects all work, physical examination, the discussion of treatments and procedures, and medical decision making performed by me.      BASILIO MALDONADO Jr, MD personally performed the services described in this documentation as scribed by nurse in my presence, and is both accurate and complete.    Electronically signed by BASILIO White Jr, MD on 3/12/2024 at 10:16 AM     Dr. ADAMA White MD  Electrophysiology  [facility].  [address].  [place], [address]  Phone: [phone]  Fax: [phone]     NOTE: This report was transcribed using voice recognition software. Every effort was made to ensure accuracy, however, inadvertent

## 2024-03-12 ENCOUNTER — OFFICE VISIT (OUTPATIENT)
Dept: CARDIOLOGY CLINIC | Age: 59
End: 2024-03-12
Payer: COMMERCIAL

## 2024-03-12 VITALS
OXYGEN SATURATION: 98 % | HEIGHT: 73 IN | WEIGHT: 269.2 LBS | DIASTOLIC BLOOD PRESSURE: 60 MMHG | SYSTOLIC BLOOD PRESSURE: 104 MMHG | BODY MASS INDEX: 35.68 KG/M2 | HEART RATE: 79 BPM

## 2024-03-12 DIAGNOSIS — I48.0 PAROXYSMAL ATRIAL FIBRILLATION (HCC): Primary | ICD-10-CM

## 2024-03-12 DIAGNOSIS — I45.10 RBBB: ICD-10-CM

## 2024-03-12 PROCEDURE — 93000 ELECTROCARDIOGRAM COMPLETE: CPT | Performed by: INTERNAL MEDICINE

## 2024-03-12 PROCEDURE — 99214 OFFICE O/P EST MOD 30 MIN: CPT | Performed by: INTERNAL MEDICINE

## 2024-03-12 RX ORDER — METOPROLOL SUCCINATE 25 MG/1
12.5 TABLET, EXTENDED RELEASE ORAL AS NEEDED
Qty: 30 TABLET | Refills: 3 | Status: SHIPPED | OUTPATIENT
Start: 2024-03-12

## 2024-03-12 RX ORDER — FLECAINIDE ACETATE 100 MG/1
100 TABLET ORAL AS NEEDED
Qty: 60 TABLET | Refills: 3 | Status: SHIPPED | OUTPATIENT
Start: 2024-03-12

## 2024-03-12 NOTE — PATIENT INSTRUCTIONS
RECOMMENDATIONS:  Continue to monitor clinically for recurrence of arrhythmia.   Continue current medications. Flecainide as needed.   Annual lab work due.   Follow up in one year with me.

## 2024-06-04 ENCOUNTER — HOSPITAL ENCOUNTER (OUTPATIENT)
Age: 59
Discharge: HOME | End: 2024-06-04
Payer: COMMERCIAL

## 2024-06-04 ENCOUNTER — OFFICE VISIT (OUTPATIENT)
Dept: PRIMARY CARE CLINIC | Age: 59
End: 2024-06-04
Payer: COMMERCIAL

## 2024-06-04 VITALS
HEIGHT: 73 IN | WEIGHT: 268 LBS | OXYGEN SATURATION: 97 % | DIASTOLIC BLOOD PRESSURE: 68 MMHG | SYSTOLIC BLOOD PRESSURE: 112 MMHG | BODY MASS INDEX: 35.52 KG/M2 | HEART RATE: 67 BPM | TEMPERATURE: 97.7 F

## 2024-06-04 DIAGNOSIS — R73.03 PREDIABETES: ICD-10-CM

## 2024-06-04 DIAGNOSIS — R10.9 LEFT FLANK PAIN: Primary | ICD-10-CM

## 2024-06-04 DIAGNOSIS — I48.0 PAROXYSMAL ATRIAL FIBRILLATION (HCC): ICD-10-CM

## 2024-06-04 DIAGNOSIS — E66.9 OBESITY (BMI 30-39.9): ICD-10-CM

## 2024-06-04 DIAGNOSIS — G47.33 MILD OBSTRUCTIVE SLEEP APNEA: ICD-10-CM

## 2024-06-04 LAB
ANION GAP SERPL CALCULATED.3IONS-SCNC: 11 MMOL/L (ref 3–16)
BASOPHILS # BLD: 0.1 K/UL (ref 0–0.2)
BASOPHILS NFR BLD: 1 %
BUN SERPL-MCNC: 15 MG/DL (ref 7–20)
CALCIUM SERPL-MCNC: 9.7 MG/DL (ref 8.3–10.6)
CHLORIDE SERPL-SCNC: 99 MMOL/L (ref 99–110)
CHOLEST SERPL-MCNC: 163 MG/DL (ref 0–199)
CO2 SERPL-SCNC: 28 MMOL/L (ref 21–32)
CREAT SERPL-MCNC: 1 MG/DL (ref 0.9–1.3)
DEPRECATED RDW RBC AUTO: 14.6 % (ref 12.4–15.4)
EOSINOPHIL # BLD: 0.2 K/UL (ref 0–0.6)
EOSINOPHIL NFR BLD: 2.3 %
GFR SERPLBLD CREATININE-BSD FMLA CKD-EPI: 86 ML/MIN/{1.73_M2}
GLUCOSE SERPL-MCNC: 83 MG/DL (ref 70–99)
HCT VFR BLD AUTO: 43.4 % (ref 40.5–52.5)
HDLC SERPL-MCNC: 61 MG/DL (ref 40–60)
HGB BLD-MCNC: 14.8 G/DL (ref 13.5–17.5)
LDLC SERPL CALC-MCNC: 89 MG/DL
LYMPHOCYTES # BLD: 1.5 K/UL (ref 1–5.1)
LYMPHOCYTES NFR BLD: 20.8 %
MCH RBC QN AUTO: 28.6 PG (ref 26–34)
MCHC RBC AUTO-ENTMCNC: 34 G/DL (ref 31–36)
MCV RBC AUTO: 84.1 FL (ref 80–100)
MONOCYTES # BLD: 0.6 K/UL (ref 0–1.3)
MONOCYTES NFR BLD: 8.8 %
NEUTROPHILS # BLD: 4.9 K/UL (ref 1.7–7.7)
NEUTROPHILS NFR BLD: 67.1 %
PLATELET # BLD AUTO: 242 K/UL (ref 135–450)
PMV BLD AUTO: 8.7 FL (ref 5–10.5)
POTASSIUM SERPL-SCNC: 4.7 MMOL/L (ref 3.5–5.1)
RBC # BLD AUTO: 5.16 M/UL (ref 4.2–5.9)
SODIUM SERPL-SCNC: 138 MMOL/L (ref 136–145)
TRIGL SERPL-MCNC: 66 MG/DL (ref 0–150)
VLDLC SERPL CALC-MCNC: 13 MG/DL
WBC # BLD AUTO: 7.3 K/UL (ref 4–11)

## 2024-06-04 PROCEDURE — 36415 COLL VENOUS BLD VENIPUNCTURE: CPT

## 2024-06-04 PROCEDURE — 80061 LIPID PANEL: CPT

## 2024-06-04 PROCEDURE — 83036 HEMOGLOBIN GLYCOSYLATED A1C: CPT

## 2024-06-04 PROCEDURE — 85025 COMPLETE CBC W/AUTO DIFF WBC: CPT

## 2024-06-04 PROCEDURE — 99214 OFFICE O/P EST MOD 30 MIN: CPT | Performed by: STUDENT IN AN ORGANIZED HEALTH CARE EDUCATION/TRAINING PROGRAM

## 2024-06-04 PROCEDURE — 80048 BASIC METABOLIC PNL TOTAL CA: CPT

## 2024-06-04 SDOH — ECONOMIC STABILITY: FOOD INSECURITY: WITHIN THE PAST 12 MONTHS, YOU WORRIED THAT YOUR FOOD WOULD RUN OUT BEFORE YOU GOT MONEY TO BUY MORE.: NEVER TRUE

## 2024-06-04 SDOH — ECONOMIC STABILITY: HOUSING INSECURITY
IN THE LAST 12 MONTHS, WAS THERE A TIME WHEN YOU DID NOT HAVE A STEADY PLACE TO SLEEP OR SLEPT IN A SHELTER (INCLUDING NOW)?: NO

## 2024-06-04 SDOH — ECONOMIC STABILITY: INCOME INSECURITY: HOW HARD IS IT FOR YOU TO PAY FOR THE VERY BASICS LIKE FOOD, HOUSING, MEDICAL CARE, AND HEATING?: NOT VERY HARD

## 2024-06-04 SDOH — ECONOMIC STABILITY: FOOD INSECURITY: WITHIN THE PAST 12 MONTHS, THE FOOD YOU BOUGHT JUST DIDN'T LAST AND YOU DIDN'T HAVE MONEY TO GET MORE.: NEVER TRUE

## 2024-06-04 SDOH — ECONOMIC STABILITY: TRANSPORTATION INSECURITY
IN THE PAST 12 MONTHS, HAS LACK OF TRANSPORTATION KEPT YOU FROM MEETINGS, WORK, OR FROM GETTING THINGS NEEDED FOR DAILY LIVING?: NO

## 2024-06-04 ASSESSMENT — PATIENT HEALTH QUESTIONNAIRE - PHQ9
1. LITTLE INTEREST OR PLEASURE IN DOING THINGS: NOT AT ALL
SUM OF ALL RESPONSES TO PHQ QUESTIONS 1-9: 0
SUM OF ALL RESPONSES TO PHQ9 QUESTIONS 1 & 2: 0
SUM OF ALL RESPONSES TO PHQ QUESTIONS 1-9: 0
SUM OF ALL RESPONSES TO PHQ9 QUESTIONS 1 & 2: 0
1. LITTLE INTEREST OR PLEASURE IN DOING THINGS: NOT AT ALL
2. FEELING DOWN, DEPRESSED OR HOPELESS: NOT AT ALL
2. FEELING DOWN, DEPRESSED OR HOPELESS: NOT AT ALL
SUM OF ALL RESPONSES TO PHQ QUESTIONS 1-9: 0
SUM OF ALL RESPONSES TO PHQ QUESTIONS 1-9: 0

## 2024-06-04 NOTE — PROGRESS NOTES
errors, some may still exist.        Justo Simeon, DO  PGY-2, Family Medicine  [facility]

## 2024-06-05 ENCOUNTER — TELEPHONE (OUTPATIENT)
Dept: CARDIOLOGY CLINIC | Age: 59
End: 2024-06-05

## 2024-06-05 LAB
EST. AVERAGE GLUCOSE BLD GHB EST-MCNC: 128.4 MG/DL
HBA1C MFR BLD: 6.1 %

## 2024-06-05 ASSESSMENT — ENCOUNTER SYMPTOMS
SHORTNESS OF BREATH: 0
WHEEZING: 0
NAUSEA: 0
CHEST TIGHTNESS: 0
ABDOMINAL PAIN: 0
VOMITING: 0
COUGH: 0
BACK PAIN: 0
CONSTIPATION: 0
DIARRHEA: 0

## 2024-06-05 NOTE — TELEPHONE ENCOUNTER
----- Message from BASILIO White Jr., MD sent at 6/5/2024  9:13 AM EDT -----  Reviewed.  Please let patient know renal function within normal limits and CBC within normal limits.

## 2024-09-11 DIAGNOSIS — I48.0 PAROXYSMAL ATRIAL FIBRILLATION (HCC): ICD-10-CM

## 2024-09-12 RX ORDER — FLECAINIDE ACETATE 100 MG/1
100 TABLET ORAL AS NEEDED
Qty: 90 TABLET | Refills: 1 | Status: SHIPPED | OUTPATIENT
Start: 2024-09-12

## 2024-10-16 DIAGNOSIS — I48.0 PAROXYSMAL ATRIAL FIBRILLATION (HCC): ICD-10-CM

## 2024-10-17 RX ORDER — FLECAINIDE ACETATE 100 MG/1
100 TABLET ORAL AS NEEDED
Qty: 90 TABLET | Refills: 1 | Status: SHIPPED | OUTPATIENT
Start: 2024-10-17

## 2025-01-15 ENCOUNTER — OFFICE VISIT (OUTPATIENT)
Age: 60
End: 2025-01-15

## 2025-01-15 VITALS
HEART RATE: 77 BPM | OXYGEN SATURATION: 97 % | SYSTOLIC BLOOD PRESSURE: 126 MMHG | TEMPERATURE: 97.5 F | DIASTOLIC BLOOD PRESSURE: 78 MMHG

## 2025-01-15 DIAGNOSIS — J01.90 ACUTE SINUSITIS, RECURRENCE NOT SPECIFIED, UNSPECIFIED LOCATION: Primary | ICD-10-CM

## 2025-01-15 RX ORDER — DEXTROMETHORPHAN HYDROBROMIDE AND PROMETHAZINE HYDROCHLORIDE 15; 6.25 MG/5ML; MG/5ML
5 SYRUP ORAL 4 TIMES DAILY PRN
Qty: 100 ML | Refills: 0 | Status: SHIPPED | OUTPATIENT
Start: 2025-01-15 | End: 2025-01-20

## 2025-01-15 RX ORDER — PREDNISONE 20 MG/1
40 TABLET ORAL DAILY
Qty: 10 TABLET | Refills: 0 | Status: SHIPPED | OUTPATIENT
Start: 2025-01-15 | End: 2025-01-20

## 2025-01-15 RX ORDER — AZITHROMYCIN 250 MG/1
TABLET, FILM COATED ORAL
Qty: 6 TABLET | Refills: 0 | Status: SHIPPED | OUTPATIENT
Start: 2025-01-15

## 2025-01-15 NOTE — PROGRESS NOTES
Nikolay Mercado (:  1965) is a 59 y.o. male,  here for evaluation of the following chief complaint(s): Cough and Nasal Congestion (Pt states has cold that his wife has /Pt states nasal congestion and cough /Pt states started Saturday and tried OTC meds and not working )    Nikolay Mercado is a: New patient.   Last Urgent Care Visit: Visit date not found  I have reviewed the patient's medications and basic medical history; see Medication Reconciliation.    ASSESSMENT/PLAN:  Diagnosis:     ICD-10-CM    1. Acute sinusitis, recurrence not specified, unspecified location  J01.90 predniSONE (DELTASONE) 20 MG tablet     azithromycin (ZITHROMAX) 250 MG tablet     promethazine-dextromethorphan (PROMETHAZINE-DM) 6.25-15 MG/5ML syrup             Medical Decision Making:   Patient was seen at Urgent Care today for productive cough; chest congestion; sinus/nasal congestion; sinus pain and pressure; x 4 day(s)     On presentation, pt appears well. They are afrebile, not hypoxic or in any respiratory distress.   Lungs clear on auscultation. Remainder of exam is largely benign without significant concerning findings.       Patient will be treated for Sinusitis    Prescription(s) provided: Zpak;, Prednisone;, and Promethazine-DM;    Patient was discharged home with follow-up and return precautions.    Patient did not have elevated blood pressure greater than 130/80. Therefore, referral to PCP for HTN is not indicated      Results:  No results found for any visits on 01/15/25.       SUBJECTIVE/OBJECTIVE:  HPI    This is a 59 y.o. male that presents today with complaint of: productive cough; chest congestion; sinus/nasal congestion;   Symptoms have been ongoing x 4 day(s)    Wife had similar symptoms the last 1-2 weeks.   Pt began with sx Saturday (4 days ago).   No improvement with OTC meds.  Subjective fever.       Vitals:    01/15/25 1621   BP: 126/78   Pulse: 77   Temp: 97.5 °F (36.4 °C)   TempSrc: Infrared   SpO2: 97%

## 2025-03-19 ENCOUNTER — TELEPHONE (OUTPATIENT)
Dept: CARDIOLOGY CLINIC | Age: 60
End: 2025-03-19

## 2025-03-19 DIAGNOSIS — I48.0 PAROXYSMAL ATRIAL FIBRILLATION (HCC): ICD-10-CM

## 2025-03-19 NOTE — TELEPHONE ENCOUNTER
Med refill:    Eliquis 5mg, BID, 180 tabs    Pharmacy: [facility] - [address] - P [phone] -  [phone] [[id number]]     Last ov: 3.12.2024 AGK  Next ov: 6.19.2025 AGK

## 2025-03-19 NOTE — TELEPHONE ENCOUNTER
Last ov: 3.12.2024 AGK  Next ov: 6.19.2025 AGK      Last labs - 06/2024    RX signed per protocol.    Yes

## 2025-06-18 NOTE — PROGRESS NOTES
[facility]   Electrophysiology Outpatient Note              Date:  June 20, 2025  Patient name: Nikolay Mercado  YOB: 1965    Primary Care physician: Patt Edge MD    HISTORY OF PRESENT ILLNESS: The patient is a 59 y.o.  male with a history of atrial fibrillation, atrial flutter, atrial fibrillation ablation, atrial flutter ablation    Patient follows with Dr. White in EP clinic.  Patient has history of atrial fibrillation.  Patient underwent MARTINEZ/DCCV for atrial fibrillation 6/5/2020 with Dr. Raygoza..  7/8/2021 underwent successful atrial fibrillation and atrial flutter ablation.  He was on amiodarone post ablation.  Patient wore a cardiac event monitor from 10/18/2021 to 10/28/2021 which demonstrated predominately NSR with an average HR of 63. On 1/27/2022, ECG demonstrates SR (62).  4/20/2023 patient seen in EP clinic and reported having episodes of atrial fibrillation triggered by exercise.  Patient given as needed prescription for metoprolol and flecainide    Today he is being seen for paroxysmal atrial fibrillation and atrial flutter. ECG shows SR RBBB with a HR of 65. Patient denies chest pain and shortness of breath this morning.  Patient does admit to having palpitations if he would not take his flecainide.  He is taking flecainide once a day usually in the morning.  He admitted to not taking his metoprolol.  I spoke to him at length about the importance of taking metoprolol with his flecainide.  Patient verbalizes understanding.  We also discussed possibly needing repeat atrial fibrillation ablation.  He states that Dr. White has also mention this in the past.  He also states that his mother and siblings also have atrial fibrillation.     Past Medical History:   has a past medical history of Mild obstructive sleep apnea.    Past Surgical History:   has no past surgical history on file.     Home Medications:    Prior to Admission medications    Medication Sig

## 2025-06-20 ENCOUNTER — OFFICE VISIT (OUTPATIENT)
Dept: CARDIOLOGY CLINIC | Age: 60
End: 2025-06-20
Payer: COMMERCIAL

## 2025-06-20 VITALS
SYSTOLIC BLOOD PRESSURE: 124 MMHG | DIASTOLIC BLOOD PRESSURE: 82 MMHG | HEIGHT: 73 IN | BODY MASS INDEX: 35.17 KG/M2 | HEART RATE: 65 BPM | OXYGEN SATURATION: 96 % | WEIGHT: 265.4 LBS

## 2025-06-20 DIAGNOSIS — I48.0 PAROXYSMAL ATRIAL FIBRILLATION (HCC): Primary | ICD-10-CM

## 2025-06-20 DIAGNOSIS — R06.02 SHORTNESS OF BREATH: ICD-10-CM

## 2025-06-20 DIAGNOSIS — R07.9 CHEST PAIN, UNSPECIFIED TYPE: ICD-10-CM

## 2025-06-20 DIAGNOSIS — I45.10 RBBB: ICD-10-CM

## 2025-06-20 PROCEDURE — 93000 ELECTROCARDIOGRAM COMPLETE: CPT

## 2025-06-20 PROCEDURE — G2211 COMPLEX E/M VISIT ADD ON: HCPCS

## 2025-06-20 PROCEDURE — 99214 OFFICE O/P EST MOD 30 MIN: CPT

## 2025-06-20 NOTE — PATIENT INSTRUCTIONS
Continue Eliquis 5 mg twice daily  Continue Toprol-XL 12.5 mg --please take with Flecainide  Continue flecainide 100 mg as needed-not to exceed 300 mg in a 24-hour period    GXT myoview-- call ADAMA to schedule

## 2025-06-23 LAB
CHOLEST SERPL-MCNC: 176 MG/DL (ref 0–199)
GLUCOSE SERPL-MCNC: 99 MG/DL (ref 70–99)
HDLC SERPL-MCNC: 58 MG/DL (ref 40–60)
LDLC SERPL CALC-MCNC: 104 MG/DL
TRIGL SERPL-MCNC: 69 MG/DL (ref 0–150)